# Patient Record
Sex: FEMALE | Race: WHITE | NOT HISPANIC OR LATINO | Employment: STUDENT | URBAN - NONMETROPOLITAN AREA
[De-identification: names, ages, dates, MRNs, and addresses within clinical notes are randomized per-mention and may not be internally consistent; named-entity substitution may affect disease eponyms.]

---

## 2020-03-04 NOTE — PROGRESS NOTES
YOB: 1995  Location: [facility]  Location Address: [address] [place] [address], [facility], [address] [place] [address]  Location Phone: [phone]    Chief Complaint   Patient presents with   • Ear Problem     fluid in ear LEFT ear and trouble hearing       History of Present Illness  Mi Whitley is a 24 y.o. female.  Mi Whitley is here for evaluation of ENT complaints. The patient has had problems with otalgia, ear fullness, ear pressure, popping and cracking of the ear, otorrhea, fluid on the ear, hearing loss and tinnitus  The symptoms are localized to the left> right  ear. The patient has had moderate symptoms. The symptoms have been present for the last several months There have been no identified factors that aggravate the symptoms. The symptoms are improved by steroids. Patient denies nasal congestion, nasal drainage and fever. She is on Flonase and states steroids help the ear more than any medication. She has had an audiogram today.     She does have a history of grinding her teeth at night.  Her dentist has talked to her about this before but she has not had a bite block or guard made.    Her ears feel pretty good today.      I have personally reviewed the information imported into the chart during this visit.      I have personally reviewed the review of systems.       Past Medical History:   Diagnosis Date   • Allergic rhinitis    • Chronic migraine    • GERD (gastroesophageal reflux disease)    • Headache        Past Surgical History:   Procedure Laterality Date   • WISDOM TOOTH EXTRACTION         Outpatient Medications Marked as Taking for the 3/5/20 encounter (Office Visit) with Anil Hinds MD   Medication Sig Dispense Refill   • CBD (cannabidiol) oral oil Take  by mouth.     • cetirizine (ZYRTEC ALLERGY) 10 MG tablet Zyrtec 10 mg tablet   Take 1 tablet every day by oral route.     • EPINEPHrine (EPIPEN) 0.3 MG/0.3ML solution auto-injector injection epinephrine 0.3 mg/0.3 mL  injection, auto-injector   PRN     • famotidine (PEPCID) 20 MG tablet Take 20 mg by mouth Daily.     • fluticasone (FLONASE) 50 MCG/ACT nasal spray 2 sprays into the nostril(s) as directed by provider Daily.         Nitrofurantoin; Clindamycin/lincomycin; and Tylenol [acetaminophen]    Family History   Problem Relation Age of Onset   • Sinusitis Maternal Grandfather    • Diabetes Other    • Hypertension Other        Social History     Socioeconomic History   • Marital status: Single     Spouse name: Not on file   • Number of children: Not on file   • Years of education: Not on file   • Highest education level: Not on file   Tobacco Use   • Smoking status: Never Smoker   • Smokeless tobacco: Never Used   Substance and Sexual Activity   • Alcohol use: Yes     Comment: occasionally   • Drug use: Never       Review of Systems   HENT: Positive for ear discharge, ear pain, hearing loss and tinnitus.    Eyes: Negative.    Respiratory: Negative.    Cardiovascular: Negative.    Gastrointestinal: Negative.    Endocrine: Negative.    Genitourinary: Negative.    Musculoskeletal: Negative.    Skin: Negative.    Allergic/Immunologic: Negative.    Neurological: Negative.    Hematological: Negative.    Psychiatric/Behavioral: Negative.        Vitals:    03/05/20 1401   BP: 114/56   Pulse: 71   Temp: 97.4 °F (36.3 °C)       Body mass index is 23.91 kg/m².    Objective     Physical Exam  CONSTITUTIONAL: well nourished, well-developed, alert, oriented, in no acute distress     COMMUNICATION AND VOICE: able to communicate normally, normal voice quality    HEAD: normocephalic, no lesions, atraumatic, no tenderness, no masses     FACE: appearance normal, no lesions, moderate tenderness on mandibular depression in the left preauricular area-mild to moderate ankylosis is noted no deformities, facial motion symmetric    EYES: ocular motility normal, eyelids normal, orbits normal, no proptosis, conjunctiva normal , pupils equal, round      EARS:  Hearing: hearing to conversational voice intact bilaterally   External Ears: normal bilaterally, no lesions  TM  AS-clear and intact tympanic membrane with well ventilated middle ear space.  External auditory canals normal in appearance  AD-clear and intact tympanic membrane with well ventilated middle ear space.  External auditory canals normal in appearance    NOSE:  External Nose: external nasal structure normal, no tenderness on palpation, no nasal discharge, no lesions, no evidence of trauma, nostrils patent     ORAL:  Lips: upper and lower lips without lesion,     NECK:  Inspection and Palpation: neck appearance normal, no masses or tenderness    CHEST/RESPIRATORY: normal respiratory effort     CARDIOVASCULAR: no cyanosis or edema     NEUROLOGICAL/PSYCHIATRIC: oriented to time, place and person, mood normal, affect appropriate, CN II-XII intact grossly      Assessment/Plan   Mi was seen today for ear problem.    Diagnoses and all orders for this visit:    Arthralgia of left temporomandibular joint    Dysfunction of both eustachian tubes      * Surgery not found *  No orders of the defined types were placed in this encounter.    Return in about 6 months (around 9/5/2020).       Patient Instructions   Call or return for problems particularly any recurrent left ear pain associated with decreased hearing.    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  I recommended a soft diet, prn NSAID's, an OTC bite block which can be obtained from a local pharmacy and Dental consult if this is not successful. I explained the nature of TMJ syndrome, treatment modalities and recommended a possible oral surgery evaluation for persistent problems or difficulties.

## 2020-03-05 ENCOUNTER — OFFICE VISIT (OUTPATIENT)
Dept: OTOLARYNGOLOGY | Facility: CLINIC | Age: 25
End: 2020-03-05

## 2020-03-05 ENCOUNTER — PROCEDURE VISIT (OUTPATIENT)
Dept: OTOLARYNGOLOGY | Facility: CLINIC | Age: 25
End: 2020-03-05

## 2020-03-05 VITALS
WEIGHT: 135 LBS | DIASTOLIC BLOOD PRESSURE: 56 MMHG | SYSTOLIC BLOOD PRESSURE: 114 MMHG | HEART RATE: 71 BPM | TEMPERATURE: 97.4 F | BODY MASS INDEX: 23.92 KG/M2 | HEIGHT: 63 IN

## 2020-03-05 DIAGNOSIS — M26.622 ARTHRALGIA OF LEFT TEMPOROMANDIBULAR JOINT: Primary | ICD-10-CM

## 2020-03-05 DIAGNOSIS — H69.82 DYSFUNCTION OF LEFT EUSTACHIAN TUBE: Primary | ICD-10-CM

## 2020-03-05 DIAGNOSIS — H69.83 DYSFUNCTION OF BOTH EUSTACHIAN TUBES: ICD-10-CM

## 2020-03-05 PROBLEM — H69.93 DYSFUNCTION OF BOTH EUSTACHIAN TUBES: Status: ACTIVE | Noted: 2020-03-05

## 2020-03-05 PROCEDURE — 99202 OFFICE O/P NEW SF 15 MIN: CPT | Performed by: OTOLARYNGOLOGY

## 2020-03-05 RX ORDER — EPINEPHRINE 0.3 MG/.3ML
INJECTION SUBCUTANEOUS
COMMUNITY
End: 2022-03-15 | Stop reason: SDUPTHER

## 2020-03-05 RX ORDER — FAMOTIDINE 20 MG/1
20 TABLET, FILM COATED ORAL DAILY
COMMUNITY
Start: 2019-12-24 | End: 2020-09-28 | Stop reason: SDUPTHER

## 2020-03-05 RX ORDER — CETIRIZINE HYDROCHLORIDE 10 MG/1
TABLET ORAL
COMMUNITY

## 2020-03-05 RX ORDER — FLUTICASONE PROPIONATE 50 MCG
2 SPRAY, SUSPENSION (ML) NASAL DAILY
COMMUNITY
End: 2021-08-06

## 2020-03-05 NOTE — PATIENT INSTRUCTIONS
Call or return for problems particularly any recurrent left ear pain associated with decreased hearing.    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  I recommended a soft diet, prn NSAID's, an OTC bite block which can be obtained from a local pharmacy and Dental consult if this is not successful. I explained the nature of TMJ syndrome, treatment modalities and recommended a possible oral surgery evaluation for persistent problems or difficulties.

## 2020-09-28 ENCOUNTER — OFFICE VISIT (OUTPATIENT)
Dept: OTOLARYNGOLOGY | Facility: CLINIC | Age: 25
End: 2020-09-28

## 2020-09-28 VITALS
DIASTOLIC BLOOD PRESSURE: 76 MMHG | TEMPERATURE: 98.6 F | HEART RATE: 74 BPM | SYSTOLIC BLOOD PRESSURE: 117 MMHG | BODY MASS INDEX: 26.22 KG/M2 | WEIGHT: 148 LBS | HEIGHT: 63 IN

## 2020-09-28 DIAGNOSIS — K21.9 LARYNGOPHARYNGEAL REFLUX (LPR): ICD-10-CM

## 2020-09-28 DIAGNOSIS — M26.622 ARTHRALGIA OF LEFT TEMPOROMANDIBULAR JOINT: ICD-10-CM

## 2020-09-28 DIAGNOSIS — J30.9 ALLERGIC RHINITIS, UNSPECIFIED SEASONALITY, UNSPECIFIED TRIGGER: Primary | ICD-10-CM

## 2020-09-28 DIAGNOSIS — J35.01 CHRONIC TONSILLITIS: ICD-10-CM

## 2020-09-28 DIAGNOSIS — J35.8 TONSILLITH: ICD-10-CM

## 2020-09-28 DIAGNOSIS — J03.01 RECURRENT STREPTOCOCCAL TONSILLITIS: ICD-10-CM

## 2020-09-28 PROCEDURE — 99214 OFFICE O/P EST MOD 30 MIN: CPT | Performed by: PHYSICIAN ASSISTANT

## 2020-09-28 RX ORDER — ACETAMINOPHEN AND CODEINE PHOSPHATE 120; 12 MG/5ML; MG/5ML
1 SOLUTION ORAL DAILY
COMMUNITY
Start: 2020-09-16 | End: 2023-03-14

## 2020-09-28 RX ORDER — FAMOTIDINE 20 MG/1
20 TABLET, FILM COATED ORAL 2 TIMES DAILY
Qty: 60 TABLET | Refills: 11 | Status: SHIPPED | OUTPATIENT
Start: 2020-09-28 | End: 2020-10-28

## 2020-09-28 RX ORDER — METHYLPREDNISOLONE 4 MG/1
TABLET ORAL
Qty: 1 EACH | Refills: 0 | Status: SHIPPED | OUTPATIENT
Start: 2020-09-28 | End: 2020-10-26

## 2020-09-28 RX ORDER — FLUCONAZOLE 150 MG/1
TABLET ORAL
COMMUNITY
Start: 2020-09-26 | End: 2020-10-26

## 2020-09-28 NOTE — PATIENT INSTRUCTIONS
Advised to take Flonase daily, increase Pepcid to twice a day, follow reflux precautions and dairy elimination. Start salt water gargles and Listerine gargles. Start a probiotic. Continue TMJ precautions.     Recheck in 4 weeks. If tonsil problems have not improved will consider getting a tonsillectomy.

## 2020-09-28 NOTE — PROGRESS NOTES
HAILEE Crow     Chief Complaint   Patient presents with   • Follow-up     better when sleeping with mouth guard   • Sore Throat     started in Feb, with reoccuring strept throat, having tonsils stones last couple of months        HISTORY OF PRESENT ILLNESS:     Mi Whitley is a  25 y.o.  female who is here for follow up. She has had complaints of sore throats, frequent tonsillitis, tonsilliths and tonsillar globus. The symptoms are localized to the bilateral tonsil. The symptoms severity was described as: moderate The symptoms have been: chronically occuring with acute flair ups occurring 3-4 times a year for the last several years The symptoms are aggravated by  no identifiable factors. The chronic symptoms are improved by no identifiable factors and the acute flair-ups are improved with antibiotics.    The patient was last seen about six months ago with left ear pain that is better with TMJ precautions.    Review of Systems   Constitutional: Negative for activity change, appetite change, chills, diaphoresis, fatigue, fever and unexpected weight change.   HENT: Positive for sore throat. Negative for congestion, dental problem, drooling, ear discharge, ear pain, facial swelling, hearing loss, mouth sores, nosebleeds, postnasal drip, rhinorrhea, sinus pressure, sneezing, tinnitus, trouble swallowing and voice change.    Eyes: Negative.    Respiratory: Negative.    Cardiovascular: Negative.    Gastrointestinal:        GERD   Endocrine: Negative.    Skin: Negative.    Allergic/Immunologic: Positive for environmental allergies. Negative for food allergies and immunocompromised state.   Neurological: Negative.    Hematological: Negative.    Psychiatric/Behavioral: Negative.    :    Past History:  Past Medical History:   Diagnosis Date   • Allergic rhinitis    • Chronic migraine    • GERD (gastroesophageal reflux disease)    • Headache      Past Surgical History:   Procedure Laterality Date   • WISDOM  TOOTH EXTRACTION       Family History   Problem Relation Age of Onset   • Sinusitis Maternal Grandfather    • Diabetes Other    • Hypertension Other      Social History     Tobacco Use   • Smoking status: Never Smoker   • Smokeless tobacco: Never Used   Substance Use Topics   • Alcohol use: Yes     Comment: occasionally   • Drug use: Never     Outpatient Medications Marked as Taking for the 9/28/20 encounter (Office Visit) with Aamir Dill PA   Medication Sig Dispense Refill   • cetirizine (ZYRTEC ALLERGY) 10 MG tablet Zyrtec 10 mg tablet   Take 1 tablet every day by oral route.     • EPINEPHrine (EPIPEN) 0.3 MG/0.3ML solution auto-injector injection epinephrine 0.3 mg/0.3 mL injection, auto-injector   PRN     • famotidine (PEPCID) 20 MG tablet Take 1 tablet by mouth 2 (Two) Times a Day for 30 days. 60 tablet 11   • fluconazole (DIFLUCAN) 150 MG tablet TAKE 1 TABLET BY MOUTH FOR ONE TIME DOSE. REPEAT IN 48 HOURS     • norethindrone (MICRONOR) 0.35 MG tablet Take 1 tablet by mouth Daily.     • [DISCONTINUED] famotidine (PEPCID) 20 MG tablet Take 20 mg by mouth Daily.       Allergies:  Clindamycin/lincomycin, Nitrofurantoin, and Tylenol [acetaminophen]          Vital Signs:   Vitals:    09/28/20 1042   BP: 117/76   Pulse: 74   Temp: 98.6 °F (37 °C)         EXAMINATION:   CONSTITUTIONAL: well nourished, alert, oriented, in no acute distress     COMMUNICATION AND VOICE: able to communicate normally, normal voice quality    HEAD: normocephalic, no lesions, atraumatic, no tenderness, no masses     FACE: appearance normal, no lesions, no tenderness, no deformities, facial motion symmetric    SALIVARY GLANDS: parotid glands with no tenderness, no swelling, no masses, submandibular glands with normal size, nontender    EYES: ocular motility normal, eyelids normal, orbits normal, no proptosis, conjunctiva normal , pupils equal, round     EARS:  Hearing: response to conversational voice normal bilaterally   External  Ears: auricles without lesions  Otoscopic: tympanic membrane appearance normal, no lesions, no perforation, normal mobility, no fluid    NOSE:  External Nose: structure normal, no tenderness on palpation, no nasal discharge, no lesions, no evidence of trauma, nostrils patent   Intranasal Exam: nasal mucosa normal, vestibule within normal limits, inferior turbinate normal, nasal septum midline     ORAL:  Lips: upper and lower lips without lesion   Teeth: dentition within normal limits for age   Gums: gingivae healthy   Oral Mucosa: oral mucosa normal, no mucosal lesions   Floor of Mouth: Warthin’s duct patent, mucosa normal  Tongue: lingual mucosa normal without lesions, normal tongue mobility   Palate: soft and hard palates with normal mucosa and structure  Oropharynx: oropharyngeal mucosa erythema with +2 erythematous cryptic tonsils    NECK: neck appearance normal, no mass,  noted without erythema or tenderness    THYROID: no overt thyromegaly, no tenderness, nodules or mass present on palpation, position midline     LYMPH NODES: no lymphadenopathy    CHEST/RESPIRATORY: respiratory effort normal, normal breath sounds     CARDIOVASCULAR: rate and rhythm normal, extremities without cyanosis or edema      NEUROLOGIC/PSYCHIATRIC: oriented to time, place and person, mood normal, affect appropriate, CN II-XII intact grossly    RESULTS REVIEW:    I have reviewed the patients old records in the chart.       Assessment    Diagnosis Plan   1. Allergic rhinitis, unspecified seasonality, unspecified trigger  methylPREDNISolone (Medrol) 4 MG dose pack   2. Laryngopharyngeal reflux (LPR)  famotidine (PEPCID) 20 MG tablet   3. Tonsillith     4. Chronic tonsillitis  methylPREDNISolone (Medrol) 4 MG dose pack   5. Recurrent streptococcal tonsillitis     6. Arthralgia of left temporomandibular joint         Plan    Patient Instructions   Advised to take Flonase daily, increase Pepcid to twice a day, follow reflux precautions and  dairy elimination. Start salt water gargles and Listerine gargles. Start a probiotic. Continue TMJ precautions.     Recheck in 4 weeks. If tonsil problems have not improved will consider getting a tonsillectomy.    New Medications Ordered This Visit   Medications   • famotidine (PEPCID) 20 MG tablet     Sig: Take 1 tablet by mouth 2 (Two) Times a Day for 30 days.     Dispense:  60 tablet     Refill:  11   • methylPREDNISolone (Medrol) 4 MG dose pack     Sig: Take as directed on package instructions.     Dispense:  1 each     Refill:  0             Return in about 4 weeks (around 10/26/2020) for Recheck tonsils.    HIALEE Crow  09/28/20  19:09 CDT

## 2020-09-29 NOTE — PROGRESS NOTES
Anil Hinds MD   I have seen and/or examined Mi Whitley and have reviewed the notes, assessments, and/or procedures and I concur with this documentation.    Anil Hinds MD, FACS  09/29/20  10:27 AM CDT

## 2020-10-26 ENCOUNTER — OFFICE VISIT (OUTPATIENT)
Dept: OTOLARYNGOLOGY | Facility: CLINIC | Age: 25
End: 2020-10-26

## 2020-10-26 VITALS
WEIGHT: 150 LBS | HEART RATE: 88 BPM | HEIGHT: 63 IN | TEMPERATURE: 98 F | DIASTOLIC BLOOD PRESSURE: 83 MMHG | SYSTOLIC BLOOD PRESSURE: 120 MMHG | BODY MASS INDEX: 26.58 KG/M2

## 2020-10-26 DIAGNOSIS — J30.9 ALLERGIC RHINITIS, UNSPECIFIED SEASONALITY, UNSPECIFIED TRIGGER: Primary | ICD-10-CM

## 2020-10-26 DIAGNOSIS — M26.622 ARTHRALGIA OF LEFT TEMPOROMANDIBULAR JOINT: ICD-10-CM

## 2020-10-26 DIAGNOSIS — J03.01 RECURRENT STREPTOCOCCAL TONSILLITIS: ICD-10-CM

## 2020-10-26 DIAGNOSIS — H69.83 DYSFUNCTION OF BOTH EUSTACHIAN TUBES: ICD-10-CM

## 2020-10-26 DIAGNOSIS — K21.9 LARYNGOPHARYNGEAL REFLUX (LPR): ICD-10-CM

## 2020-10-26 DIAGNOSIS — J35.8 TONSILLITH: ICD-10-CM

## 2020-10-26 PROCEDURE — 99214 OFFICE O/P EST MOD 30 MIN: CPT | Performed by: PHYSICIAN ASSISTANT

## 2020-10-26 RX ORDER — AZELASTINE 1 MG/ML
2 SPRAY, METERED NASAL 2 TIMES DAILY
Qty: 30 ML | Refills: 11 | Status: SHIPPED | OUTPATIENT
Start: 2020-10-26 | End: 2022-11-30

## 2020-10-26 RX ORDER — METRONIDAZOLE 7.5 MG/G
GEL VAGINAL
COMMUNITY
Start: 2020-10-23 | End: 2020-12-07

## 2020-10-26 NOTE — PROGRESS NOTES
HAILEE Crow     Chief Complaint   Patient presents with   • Follow-up     tonsils        HISTORY OF PRESENT ILLNESS:     Mi Whitley is a  25 y.o.  female who is here for follow up. She has had complaints of sore throats, frequent tonsillitis, tonsilliths and tonsillar globus. The symptoms are localized to the bilateral tonsil. The symptoms severity was described as: improving. The symptoms have been: decreasing in amount for the last several weeks. The symptoms are aggravated by  allergy and weather change. The chronic symptoms are improved with current treatment plan with Pepcid, Flonase, zyrtec, salt water gargles, and Listerine gargles.      Review of Systems   Constitutional: Negative for activity change, appetite change, chills, diaphoresis, fatigue, fever and unexpected weight change.   HENT: Positive for sore throat. Negative for congestion, dental problem, drooling, ear discharge, ear pain, facial swelling, hearing loss, mouth sores, nosebleeds, postnasal drip, rhinorrhea, sinus pressure, sneezing, tinnitus, trouble swallowing and voice change.    Eyes: Negative.    Respiratory: Negative.    Cardiovascular: Negative.    Gastrointestinal:        GERD   Endocrine: Negative.    Skin: Negative.    Allergic/Immunologic: Positive for environmental allergies. Negative for food allergies and immunocompromised state.   Neurological: Negative.    Hematological: Negative.    Psychiatric/Behavioral: Negative.    :    Past History:  Past Medical History:   Diagnosis Date   • Allergic rhinitis    • Chronic migraine    • GERD (gastroesophageal reflux disease)    • Headache      Past Surgical History:   Procedure Laterality Date   • WISDOM TOOTH EXTRACTION       Family History   Problem Relation Age of Onset   • Sinusitis Maternal Grandfather    • Diabetes Other    • Hypertension Other      Social History     Tobacco Use   • Smoking status: Never Smoker   • Smokeless tobacco: Never Used   Substance Use  Topics   • Alcohol use: Yes     Comment: occasionally   • Drug use: Never     Outpatient Medications Marked as Taking for the 10/26/20 encounter (Office Visit) with Aamir Dill PA   Medication Sig Dispense Refill   • cetirizine (ZYRTEC ALLERGY) 10 MG tablet Zyrtec 10 mg tablet   Take 1 tablet every day by oral route.     • EPINEPHrine (EPIPEN) 0.3 MG/0.3ML solution auto-injector injection epinephrine 0.3 mg/0.3 mL injection, auto-injector   PRN     • famotidine (PEPCID) 20 MG tablet Take 1 tablet by mouth 2 (Two) Times a Day for 30 days. 60 tablet 11   • metroNIDAZOLE (METROGEL) 0.75 % vaginal gel INSERT 1 APPLICATORFUL VAGINALLY ONCE DAILY FOR 5 DAYS     • norethindrone (MICRONOR) 0.35 MG tablet Take 1 tablet by mouth Daily.       Allergies:  Clindamycin/lincomycin, Nitrofurantoin, and Tylenol [acetaminophen]          Vital Signs:   Vitals:    10/26/20 0955   BP: 120/83   Pulse: 88   Temp: 98 °F (36.7 °C)         EXAMINATION:   CONSTITUTIONAL: well nourished, alert, oriented, in no acute distress     COMMUNICATION AND VOICE: able to communicate normally, normal voice quality    HEAD: normocephalic, no lesions, atraumatic, no tenderness, no masses     FACE: appearance normal, no lesions, no tenderness, no deformities, facial motion symmetric    SALIVARY GLANDS: parotid glands with no tenderness, no swelling, no masses, submandibular glands with normal size, nontender    EYES: ocular motility normal, eyelids normal, orbits normal, no proptosis, conjunctiva normal , pupils equal, round     EARS:  Hearing: response to conversational voice normal bilaterally   External Ears: auricles without lesions    NOSE:  External Nose: structure normal, no tenderness on palpation, no nasal discharge, no lesions, no evidence of trauma, nostrils patent     ORAL:  Lips: upper and lower lips without lesion   Teeth: dentition within normal limits for age   Gums: gingivae healthy   Oral Mucosa: oral mucosa normal, no mucosal  lesions   Floor of Mouth: Warthin’s duct patent, mucosa normal  Tongue: lingual mucosa normal without lesions, normal tongue mobility   Palate: soft and hard palates with normal mucosa and structure  Oropharynx: oropharyngeal mucosa normal with +1 cryptic tonsils    NECK: neck appearance normal, no mass,  noted without erythema or tenderness    THYROID: no overt thyromegaly, no tenderness, nodules or mass present on palpation, position midline     LYMPH NODES: no lymphadenopathy    CHEST/RESPIRATORY: respiratory effort normal, normal breath sounds     CARDIOVASCULAR: rate and rhythm normal, extremities without cyanosis or edema      NEUROLOGIC/PSYCHIATRIC: oriented to time, place and person, mood normal, affect appropriate, CN II-XII intact grossly    RESULTS REVIEW:    I have reviewed the patients old records in the chart.       Assessment    Diagnosis Plan   1. Allergic rhinitis, unspecified seasonality, unspecified trigger  azelastine (ASTELIN) 0.1 % nasal spray   2. Laryngopharyngeal reflux (LPR)     3. Recurrent streptococcal tonsillitis     4. Tonsillith     5. Dysfunction of both eustachian tubes     6. Arthralgia of left temporomandibular joint         Plan    Patient Instructions   Continue treatment as directed, will start astelin as needed for days with increased allergy symptoms. If symptoms get worse call for sooner appointment, otherwise recheck in 6 months.    New Medications Ordered This Visit   Medications   • azelastine (ASTELIN) 0.1 % nasal spray     Si sprays into the nostril(s) as directed by provider 2 (Two) Times a Day. Use in each nostril as directed     Dispense:  30 mL     Refill:  11             Return in about 6 months (around 2021) for Recheck throat.    HAILEE Crow  10/26/20  10:15 CDT

## 2020-10-26 NOTE — PATIENT INSTRUCTIONS
Continue treatment as directed, will start astelin as needed for days with increased allergy symptoms. If symptoms get worse call for sooner appointment, otherwise recheck in 6 months.

## 2020-11-30 ENCOUNTER — OFFICE VISIT (OUTPATIENT)
Dept: FAMILY MEDICINE CLINIC | Facility: CLINIC | Age: 25
End: 2020-11-30

## 2020-11-30 VITALS
HEIGHT: 63 IN | BODY MASS INDEX: 25.8 KG/M2 | WEIGHT: 145.6 LBS | RESPIRATION RATE: 16 BRPM | TEMPERATURE: 97.8 F | SYSTOLIC BLOOD PRESSURE: 116 MMHG | HEART RATE: 87 BPM | OXYGEN SATURATION: 99 % | DIASTOLIC BLOOD PRESSURE: 79 MMHG

## 2020-11-30 DIAGNOSIS — Z76.89 ENCOUNTER TO ESTABLISH CARE: ICD-10-CM

## 2020-11-30 DIAGNOSIS — Z00.00 ANNUAL PHYSICAL EXAM: ICD-10-CM

## 2020-11-30 DIAGNOSIS — R41.840 IMPAIRED ATTENTION: Primary | ICD-10-CM

## 2020-11-30 DIAGNOSIS — R41.840 DIFFICULTY CONCENTRATING: ICD-10-CM

## 2020-11-30 DIAGNOSIS — F41.9 ANXIETY: ICD-10-CM

## 2020-11-30 PROCEDURE — 99203 OFFICE O/P NEW LOW 30 MIN: CPT | Performed by: NURSE PRACTITIONER

## 2020-11-30 RX ORDER — FAMOTIDINE 20 MG/1
20 TABLET, FILM COATED ORAL 2 TIMES DAILY
COMMUNITY
End: 2021-04-30 | Stop reason: SDUPTHER

## 2020-11-30 NOTE — PROGRESS NOTES
Subjective   Mi Whitley is a 25 y.o. female presents in office to establish care and evaluation adhd.    History of Present Illness   Issues with focusing- staying focused- during tasks starts thinking about other things and stops doing tasks  Cannot remember new information  Mild fidgeting- not very much hyperactivity  Appetite good  Sleep varies. Toss and turns when more stressed.   Has anxiety related to overwhelmed with daily tasks  Has history of depression and treated for years  Has tried zoloft, buspar and wellbutrin and did not tolerate- has been about 6 years since previous medications    pmh- anxiety/depression, migraines, vasovagal syncope (about 8 yrs ago), arhythmia from stress, tonsillitis (seeing ent currently for)  psh- wisdom teeth removal    Single- has sex with same male partner- uses oc- last pap smear 2018 in [place], [place]  No children  Work and goes to college- studying business administration  No history of drug abuse, no tobacco, no excessive alcohol use  Does not typically get influenza vaccine- declines   Eats pretty healthy diet, usually tries to exercise 3-4 times weekly- has not lately    The following portions of the patient's history were reviewed and updated as appropriate: allergies, current medications, past family history, past medical history, past social history, past surgical history and problem list.    Review of Systems   Constitutional: Negative for activity change, appetite change, chills, diaphoresis, fatigue, fever and unexpected weight change.   HENT: Negative for congestion, rhinorrhea and trouble swallowing.    Respiratory: Negative for cough and shortness of breath.    Cardiovascular: Negative for chest pain, palpitations and leg swelling.   Gastrointestinal: Negative for abdominal pain, constipation, diarrhea, nausea and vomiting.   Genitourinary: Negative for difficulty urinating and dysuria.   Musculoskeletal: Negative for arthralgias, back pain and myalgias.    Skin: Negative for rash.   Neurological: Negative for headaches.   Psychiatric/Behavioral: Positive for decreased concentration. Negative for agitation, behavioral problems, confusion, dysphoric mood, sleep disturbance and suicidal ideas. The patient is nervous/anxious. The patient is not hyperactive.        Objective     Vitals:    11/30/20 0829   BP: 116/79   Pulse: 87   Resp: 16   Temp: 97.8 °F (36.6 °C)   SpO2: 99%       Physical Exam  Vitals signs and nursing note reviewed.   Constitutional:       General: She is not in acute distress.     Appearance: She is well-developed.   HENT:      Right Ear: Tympanic membrane, ear canal and external ear normal.      Left Ear: Tympanic membrane, ear canal and external ear normal.      Nose: Nose normal.      Right Sinus: No maxillary sinus tenderness or frontal sinus tenderness.      Left Sinus: No maxillary sinus tenderness or frontal sinus tenderness.      Mouth/Throat:      Pharynx: Uvula midline. No uvula swelling.   Eyes:      Conjunctiva/sclera: Conjunctivae normal.   Neck:      Musculoskeletal: Neck supple.      Thyroid: No thyromegaly.      Trachea: No tracheal deviation.   Cardiovascular:      Rate and Rhythm: Normal rate and regular rhythm.      Heart sounds: Normal heart sounds.   Pulmonary:      Effort: Pulmonary effort is normal.      Breath sounds: Normal breath sounds.   Abdominal:      General: Bowel sounds are normal. There is no abdominal bruit.      Palpations: Abdomen is soft. There is no mass.      Tenderness: There is no abdominal tenderness.   Lymphadenopathy:      Cervical: No cervical adenopathy.   Skin:     General: Skin is warm and dry.   Neurological:      Mental Status: She is alert.   Psychiatric:         Behavior: Behavior normal.            Patient's Body mass index is 25.79 kg/m². BMI is above normal parameters. Recommendations include: exercise counseling       Assessment/Plan   Diagnoses and all orders for this visit:    1. Impaired  attention (Primary)  -     TSH  -     T4, Free  -     CBC & Differential  -     Comprehensive metabolic panel  -     Vitamin B12  -     Vitamin D 25 hydroxy    2. Encounter to establish care    3. Difficulty concentrating  -     TSH  -     T4, Free  -     CBC & Differential  -     Comprehensive metabolic panel  -     Vitamin B12  -     Vitamin D 25 hydroxy    4. Anxiety    5. Annual physical exam      Counseling/Anticipatory Guidance: discussed health nutrition, family planning/contraception, physical activity, healthy weight. Advised on injury prevention, misuse of tobacco, alcohol and drugs, sexual behavior and STDs. Encouraged regular dental health, mental health.   Discussed recommended immunizations, screenings for her age via - declines all vaccinations today- gets std screening with gynecology regularly  Encouraged sbe    Packet given for ADHD interview- f/u with Dr. Belle for eval of this- discussed with patient that Dr. Belle will probably want to try other medications prior to narcotics, leaving these as last resort or possibly even wanting to refer pt to psych for further eval before using these for adhd treatment.    Discussed a variety of possible medication options for her to research        Return in about 1 week (around 12/7/2020) for Recheck with Dr. Belle for ADHD evaluation- follow up in 6 months with me.             Electronically signed by JEAN Stephenson, 11/30/20, 9:19 AM CST.

## 2020-12-01 DIAGNOSIS — E55.9 VITAMIN D DEFICIENCY: Primary | ICD-10-CM

## 2020-12-01 LAB
25(OH)D3+25(OH)D2 SERPL-MCNC: 18.3 NG/ML (ref 30–100)
ALBUMIN SERPL-MCNC: 4.6 G/DL (ref 3.5–5.2)
ALBUMIN/GLOB SERPL: 1.8 G/DL
ALP SERPL-CCNC: 75 U/L (ref 39–117)
ALT SERPL-CCNC: 13 U/L (ref 1–33)
AST SERPL-CCNC: 19 U/L (ref 1–32)
BASOPHILS # BLD AUTO: 0.04 10*3/MM3 (ref 0–0.2)
BASOPHILS NFR BLD AUTO: 0.6 % (ref 0–1.5)
BILIRUB SERPL-MCNC: 0.4 MG/DL (ref 0–1.2)
BUN SERPL-MCNC: 8 MG/DL (ref 6–20)
BUN/CREAT SERPL: 11.1 (ref 7–25)
CALCIUM SERPL-MCNC: 9.3 MG/DL (ref 8.6–10.5)
CHLORIDE SERPL-SCNC: 100 MMOL/L (ref 98–107)
CO2 SERPL-SCNC: 26.2 MMOL/L (ref 22–29)
CREAT SERPL-MCNC: 0.72 MG/DL (ref 0.57–1)
EOSINOPHIL # BLD AUTO: 0.09 10*3/MM3 (ref 0–0.4)
EOSINOPHIL NFR BLD AUTO: 1.3 % (ref 0.3–6.2)
ERYTHROCYTE [DISTWIDTH] IN BLOOD BY AUTOMATED COUNT: 11.4 % (ref 12.3–15.4)
GLOBULIN SER CALC-MCNC: 2.6 GM/DL
GLUCOSE SERPL-MCNC: 78 MG/DL (ref 65–99)
HCT VFR BLD AUTO: 44.1 % (ref 34–46.6)
HGB BLD-MCNC: 14.5 G/DL (ref 12–15.9)
IMM GRANULOCYTES # BLD AUTO: 0.02 10*3/MM3 (ref 0–0.05)
IMM GRANULOCYTES NFR BLD AUTO: 0.3 % (ref 0–0.5)
LYMPHOCYTES # BLD AUTO: 1.74 10*3/MM3 (ref 0.7–3.1)
LYMPHOCYTES NFR BLD AUTO: 24.5 % (ref 19.6–45.3)
MCH RBC QN AUTO: 29.7 PG (ref 26.6–33)
MCHC RBC AUTO-ENTMCNC: 32.9 G/DL (ref 31.5–35.7)
MCV RBC AUTO: 90.2 FL (ref 79–97)
MONOCYTES # BLD AUTO: 0.69 10*3/MM3 (ref 0.1–0.9)
MONOCYTES NFR BLD AUTO: 9.7 % (ref 5–12)
NEUTROPHILS # BLD AUTO: 4.51 10*3/MM3 (ref 1.7–7)
NEUTROPHILS NFR BLD AUTO: 63.6 % (ref 42.7–76)
NRBC BLD AUTO-RTO: 0 /100 WBC (ref 0–0.2)
PLATELET # BLD AUTO: 253 10*3/MM3 (ref 140–450)
POTASSIUM SERPL-SCNC: 4.1 MMOL/L (ref 3.5–5.2)
PROT SERPL-MCNC: 7.2 G/DL (ref 6–8.5)
RBC # BLD AUTO: 4.89 10*6/MM3 (ref 3.77–5.28)
SODIUM SERPL-SCNC: 137 MMOL/L (ref 136–145)
T4 FREE SERPL-MCNC: 1.19 NG/DL (ref 0.93–1.7)
TSH SERPL DL<=0.005 MIU/L-ACNC: 1.95 UIU/ML (ref 0.27–4.2)
VIT B12 SERPL-MCNC: 573 PG/ML (ref 211–946)
WBC # BLD AUTO: 7.09 10*3/MM3 (ref 3.4–10.8)

## 2020-12-01 RX ORDER — ERGOCALCIFEROL 1.25 MG/1
50000 CAPSULE ORAL WEEKLY
Qty: 6 CAPSULE | Refills: 0 | Status: SHIPPED | OUTPATIENT
Start: 2020-12-01 | End: 2021-05-10 | Stop reason: SDDI

## 2020-12-07 ENCOUNTER — OFFICE VISIT (OUTPATIENT)
Dept: FAMILY MEDICINE CLINIC | Facility: CLINIC | Age: 25
End: 2020-12-07

## 2020-12-07 VITALS
WEIGHT: 146.4 LBS | RESPIRATION RATE: 16 BRPM | HEART RATE: 77 BPM | SYSTOLIC BLOOD PRESSURE: 116 MMHG | BODY MASS INDEX: 25 KG/M2 | TEMPERATURE: 96.2 F | HEIGHT: 64 IN | OXYGEN SATURATION: 98 % | DIASTOLIC BLOOD PRESSURE: 70 MMHG

## 2020-12-07 DIAGNOSIS — F90.9 ADULT ADHD: Primary | ICD-10-CM

## 2020-12-07 PROCEDURE — 93000 ELECTROCARDIOGRAM COMPLETE: CPT | Performed by: FAMILY MEDICINE

## 2020-12-07 PROCEDURE — 99213 OFFICE O/P EST LOW 20 MIN: CPT | Performed by: FAMILY MEDICINE

## 2020-12-07 RX ORDER — NYSTATIN AND TRIAMCINOLONE ACETONIDE 100000; 1 [USP'U]/G; MG/G
OINTMENT TOPICAL 2 TIMES DAILY PRN
COMMUNITY

## 2020-12-07 NOTE — PROGRESS NOTES
"Subjective cc: ADHD luke Whitley is a 25 y.o. female who presents with complaint of ADHD.  She brings her packet for review today.    She is concerned about ADHD - thought it was an issue when she was younger but her parents didn't want ot start her on medication. She has difficulty retaining information. She feels like it is affecting her daily life. She cannot get anything done. hse is currently working nad in school. Her grades are not the best. She usually has a C average but the last 1-2 years she has been struggling more. Her job is okay - not at risk of getting fired but it is difficult - cant retain the information.     No known cardiac issues - has seen a cardio because of palpitations - advised it was due to stress. She is no longer having palpitations.        History of Present Illness     The following portions of the patient's history were reviewed and updated as appropriate: allergies, current medications, past family history, past medical history, past social history, past surgical history and problem list.        Review of Systems   Cardiovascular: Negative for chest pain and palpitations.   Psychiatric/Behavioral: Positive for decreased concentration. The patient is nervous/anxious.    All other systems reviewed and are negative.      Objective   Blood pressure 116/70, pulse 77, temperature 96.2 °F (35.7 °C), temperature source Infrared, resp. rate 16, height 161.3 cm (63.5\"), weight 66.4 kg (146 lb 6.4 oz), SpO2 98 %, not currently breastfeeding.    Physical Exam  Vitals signs and nursing note reviewed.   Constitutional:       General: She is not in acute distress.     Appearance: She is well-developed. She is not diaphoretic.   HENT:      Head: Normocephalic and atraumatic.      Right Ear: External ear normal.      Left Ear: External ear normal.      Nose: Nose normal.   Eyes:      General:         Right eye: No discharge.         Left eye: No discharge.      Conjunctiva/sclera: " Conjunctivae normal.   Neck:      Musculoskeletal: Normal range of motion.      Thyroid: No thyromegaly.      Trachea: No tracheal deviation.   Cardiovascular:      Rate and Rhythm: Normal rate and regular rhythm.      Heart sounds: Normal heart sounds.   Pulmonary:      Effort: Pulmonary effort is normal. No respiratory distress.      Breath sounds: Normal breath sounds. No stridor. No wheezing.   Chest:      Chest wall: No tenderness.   Abdominal:      General: There is no distension.      Palpations: Abdomen is soft.      Tenderness: There is no abdominal tenderness.   Musculoskeletal: Normal range of motion.   Lymphadenopathy:      Cervical: No cervical adenopathy.   Skin:     General: Skin is warm and dry.   Neurological:      Mental Status: She is alert and oriented to person, place, and time.      Motor: No abnormal muscle tone.      Coordination: Coordination normal.   Psychiatric:         Mood and Affect: Mood normal.         Behavior: Behavior normal.         Thought Content: Thought content normal.         Judgment: Judgment normal.         ECG 12 Lead    Date/Time: 12/7/2020 11:05 AM  Performed by: Mi Belle MD  Authorized by: Mi Belle MD   Previous ECG: no previous ECG available  Rhythm: sinus rhythm  Rate: normal  QRS axis: normal    Clinical impression: normal ECG            Assessment/Plan   Problems Addressed this Visit     None      Visit Diagnoses     Adult ADHD    -  Primary    Relevant Medications    lisdexamfetamine (Vyvanse) 30 MG capsule    Other Relevant Orders    ECG 12 Lead    ToxASSURE Select 13 (MW) - Urine, Clean Catch      Diagnoses       Codes Comments    Adult ADHD    -  Primary ICD-10-CM: F90.9  ICD-9-CM: 314.01           Advised on risks and beenfits of medication   Controlled contract   UDS  EKG normal   Trial of vyvanse - recheck in 1 month           This document has been electronically signed by Mi Belle MD on December 7, 2020 11:06 CST

## 2020-12-10 ENCOUNTER — TELEPHONE (OUTPATIENT)
Dept: FAMILY MEDICINE CLINIC | Facility: CLINIC | Age: 25
End: 2020-12-10

## 2020-12-10 LAB — DRUGS UR: NORMAL

## 2020-12-10 NOTE — TELEPHONE ENCOUNTER
PATIENT CALLED TODAY STATING SHE IS HAVING SOME PROBLEMS WITH THE MEDICATION THAT WAS PRESCRIBED TO HER. THE LEFT SIDE OF HER HEAD AND NECK IS HURTING, AND SHE IS  CONFUSED AT TIMES AND HAS A  WEIRD FEELING ALL OVER BODY AND VISION IS BLURRED. MEDICATION IS  lisdexamfetamine (Vyvanse) 30 MG capsule.     PLEASE CALL PATIENT BACK  [phone]

## 2021-01-07 ENCOUNTER — OFFICE VISIT (OUTPATIENT)
Dept: FAMILY MEDICINE CLINIC | Facility: CLINIC | Age: 26
End: 2021-01-07

## 2021-01-07 VITALS
HEIGHT: 64 IN | DIASTOLIC BLOOD PRESSURE: 72 MMHG | HEART RATE: 81 BPM | TEMPERATURE: 96 F | OXYGEN SATURATION: 100 % | RESPIRATION RATE: 14 BRPM | WEIGHT: 145.2 LBS | SYSTOLIC BLOOD PRESSURE: 116 MMHG | BODY MASS INDEX: 24.79 KG/M2

## 2021-01-07 DIAGNOSIS — G43.809 OTHER MIGRAINE WITHOUT STATUS MIGRAINOSUS, NOT INTRACTABLE: ICD-10-CM

## 2021-01-07 DIAGNOSIS — F90.9 ADULT ADHD: Primary | ICD-10-CM

## 2021-01-07 PROCEDURE — 99214 OFFICE O/P EST MOD 30 MIN: CPT | Performed by: FAMILY MEDICINE

## 2021-01-07 RX ORDER — DEXTROAMPHETAMINE SACCHARATE, AMPHETAMINE ASPARTATE MONOHYDRATE, DEXTROAMPHETAMINE SULFATE AND AMPHETAMINE SULFATE 2.5; 2.5; 2.5; 2.5 MG/1; MG/1; MG/1; MG/1
10 CAPSULE, EXTENDED RELEASE ORAL EVERY MORNING
Qty: 30 CAPSULE | Refills: 0 | Status: SHIPPED | OUTPATIENT
Start: 2021-01-07 | End: 2021-02-09 | Stop reason: SDUPTHER

## 2021-01-07 NOTE — PROGRESS NOTES
"Subjective cc: ADHD luke Whitley is a 25 y.o. female who presents with complaint of ADHD.    She reports she had a migraine with vyvanse and stopped it. Was seen in ED because of numbness and need to r/o CVA.   She is interested in trying something else for ADHD.     No known cardiac issues - has seen a cardio because of palpitations - advised it was due to stress. She is no longer having palpitations.        History of Present Illness     The following portions of the patient's history were reviewed and updated as appropriate: allergies, current medications, past family history, past medical history, past social history, past surgical history and problem list.        Review of Systems   Cardiovascular: Negative for chest pain and palpitations.   Skin: Positive for rash.   Neurological: Positive for headaches.        Now resolved   Psychiatric/Behavioral: Positive for decreased concentration. The patient is nervous/anxious.    All other systems reviewed and are negative.      Objective   Blood pressure 116/72, pulse 81, temperature 96 °F (35.6 °C), temperature source Infrared, resp. rate 14, height 161.3 cm (63.5\"), weight 65.9 kg (145 lb 3.2 oz), SpO2 100 %, not currently breastfeeding.    Physical Exam  Vitals signs and nursing note reviewed.   Constitutional:       General: She is not in acute distress.     Appearance: She is well-developed. She is not diaphoretic.   HENT:      Head: Normocephalic and atraumatic.      Right Ear: External ear normal.      Left Ear: External ear normal.      Nose: Nose normal.   Eyes:      General:         Right eye: No discharge.         Left eye: No discharge.      Conjunctiva/sclera: Conjunctivae normal.   Neck:      Musculoskeletal: Normal range of motion.      Thyroid: No thyromegaly.      Trachea: No tracheal deviation.   Cardiovascular:      Rate and Rhythm: Normal rate and regular rhythm.      Heart sounds: Normal heart sounds.   Pulmonary:      Effort: Pulmonary " effort is normal. No respiratory distress.      Breath sounds: Normal breath sounds. No stridor. No wheezing.   Chest:      Chest wall: No tenderness.   Musculoskeletal: Normal range of motion.   Lymphadenopathy:      Cervical: No cervical adenopathy.   Skin:     General: Skin is warm and dry.      Findings: Rash (bilateral forearms) present.   Neurological:      Mental Status: She is alert and oriented to person, place, and time.      Motor: No abnormal muscle tone.      Coordination: Coordination normal.   Psychiatric:         Mood and Affect: Mood normal.         Behavior: Behavior normal.         Thought Content: Thought content normal.         Judgment: Judgment normal.       Procedures    Assessment/Plan   Problems Addressed this Visit     None      Visit Diagnoses     Adult ADHD    -  Primary    Relevant Medications    amphetamine-dextroamphetamine XR (Adderall XR) 10 MG 24 hr capsule    Other migraine without status migrainosus, not intractable          Diagnoses       Codes Comments    Adult ADHD    -  Primary ICD-10-CM: F90.9  ICD-9-CM: 314.01     Other migraine without status migrainosus, not intractable     ICD-10-CM: G43.809  ICD-9-CM: 346.80           Advised on risks and benefits of medication   Controlled contract in chart  UDS UTD  EKG normal   Trial of adderall XR - recheck in 1 month   Can use current steroid cream on rash           This document has been electronically signed by Mi Belle MD on January 7, 2021 09:48 CST

## 2021-02-09 ENCOUNTER — TELEMEDICINE (OUTPATIENT)
Dept: FAMILY MEDICINE CLINIC | Facility: CLINIC | Age: 26
End: 2021-02-09

## 2021-02-09 DIAGNOSIS — F90.9 ADULT ADHD: ICD-10-CM

## 2021-02-09 PROCEDURE — 99212 OFFICE O/P EST SF 10 MIN: CPT | Performed by: FAMILY MEDICINE

## 2021-02-09 RX ORDER — DEXTROAMPHETAMINE SACCHARATE, AMPHETAMINE ASPARTATE MONOHYDRATE, DEXTROAMPHETAMINE SULFATE AND AMPHETAMINE SULFATE 3.75; 3.75; 3.75; 3.75 MG/1; MG/1; MG/1; MG/1
15 CAPSULE, EXTENDED RELEASE ORAL EVERY MORNING
Qty: 30 CAPSULE | Refills: 0 | Status: SHIPPED | OUTPATIENT
Start: 2021-02-09 | End: 2021-03-11 | Stop reason: SDUPTHER

## 2021-02-09 NOTE — PROGRESS NOTES
Subjective cc: ADHD luke Whitley is a 25 y.o. female who presents via video visit with complaint of ADHD.  She consents to video visit.     She feels like the adderall XR 10 mg is working okay, it wears off quickly however and when it wears off she gets a headache and occasionally a little dizzy.       No known cardiac issues - has seen a cardio because of palpitations - advised it was due to stress. She is no longer having palpitations.        History of Present Illness     The following portions of the patient's history were reviewed and updated as appropriate: allergies, current medications, past family history, past medical history, past social history, past surgical history and problem list.        Review of Systems   Cardiovascular: Negative for chest pain and palpitations.   Neurological: Positive for headaches.   Psychiatric/Behavioral: Positive for decreased concentration. The patient is nervous/anxious.    All other systems reviewed and are negative.      Objective   not currently breastfeeding.    Physical Exam  Procedures    Assessment/Plan   Problems Addressed this Visit     None      Visit Diagnoses     Adult ADHD        Relevant Medications    amphetamine-dextroamphetamine XR (ADDERALL XR) 15 MG 24 hr capsule      Diagnoses       Codes Comments    Adult ADHD     ICD-10-CM: F90.9  ICD-9-CM: 314.01           Advised on risks and benefits of medication   Controlled contract in chart  UDS UTD  EKG normal   Increase dose of adderall XR - recheck in 3 months     10 minutes          This document has been electronically signed by Mi Belle MD on February 9, 2021 11:55 CST

## 2021-03-11 DIAGNOSIS — F90.9 ADULT ADHD: ICD-10-CM

## 2021-03-11 RX ORDER — DEXTROAMPHETAMINE SACCHARATE, AMPHETAMINE ASPARTATE MONOHYDRATE, DEXTROAMPHETAMINE SULFATE AND AMPHETAMINE SULFATE 3.75; 3.75; 3.75; 3.75 MG/1; MG/1; MG/1; MG/1
15 CAPSULE, EXTENDED RELEASE ORAL EVERY MORNING
Qty: 30 CAPSULE | Refills: 0 | Status: SHIPPED | OUTPATIENT
Start: 2021-03-11 | End: 2021-04-13 | Stop reason: SDUPTHER

## 2021-03-11 NOTE — TELEPHONE ENCOUNTER
Caller: Mi Whitley    Relationship: Self    Best call back number: [phone]    Medication needed:   Requested Prescriptions     Pending Prescriptions Disp Refills   • amphetamine-dextroamphetamine XR (ADDERALL XR) 15 MG 24 hr capsule 30 capsule 0     Sig: Take 1 capsule by mouth Every Morning     Does the patient have less than a 3 day supply:  [x] Yes  [] No    What is the patient's preferred pharmacy: [address] [phone] [facility] [phone]

## 2021-04-13 DIAGNOSIS — F90.9 ADULT ADHD: ICD-10-CM

## 2021-04-13 NOTE — TELEPHONE ENCOUNTER
Caller: Mi Whitley    Relationship: Self    Best call back number: [phone]    Medication needed:   Requested Prescriptions     Pending Prescriptions Disp Refills   • amphetamine-dextroamphetamine XR (ADDERALL XR) 15 MG 24 hr capsule 30 capsule 0     Sig: Take 1 capsule by mouth Every Morning       When do you need the refill by: 4/13/21        Does the patient have less than a 3 day supply:  [x] Yes  [] No    What is the patient's preferred pharmacy: [address] [phone] [facility] [phone]

## 2021-04-15 RX ORDER — DEXTROAMPHETAMINE SACCHARATE, AMPHETAMINE ASPARTATE MONOHYDRATE, DEXTROAMPHETAMINE SULFATE AND AMPHETAMINE SULFATE 3.75; 3.75; 3.75; 3.75 MG/1; MG/1; MG/1; MG/1
15 CAPSULE, EXTENDED RELEASE ORAL EVERY MORNING
Qty: 30 CAPSULE | Refills: 0 | Status: SHIPPED | OUTPATIENT
Start: 2021-04-15 | End: 2021-05-10 | Stop reason: SDUPTHER

## 2021-04-30 ENCOUNTER — OFFICE VISIT (OUTPATIENT)
Dept: OTOLARYNGOLOGY | Facility: CLINIC | Age: 26
End: 2021-04-30

## 2021-04-30 VITALS
BODY MASS INDEX: 23.83 KG/M2 | SYSTOLIC BLOOD PRESSURE: 129 MMHG | HEART RATE: 91 BPM | DIASTOLIC BLOOD PRESSURE: 79 MMHG | HEIGHT: 64 IN | WEIGHT: 139.6 LBS | TEMPERATURE: 97.1 F

## 2021-04-30 DIAGNOSIS — K21.9 LARYNGOPHARYNGEAL REFLUX (LPR): ICD-10-CM

## 2021-04-30 DIAGNOSIS — J31.0 CHRONIC RHINITIS: Primary | ICD-10-CM

## 2021-04-30 DIAGNOSIS — J35.8 TONSILLITH: ICD-10-CM

## 2021-04-30 DIAGNOSIS — J03.01 RECURRENT STREPTOCOCCAL TONSILLITIS: ICD-10-CM

## 2021-04-30 PROCEDURE — 99214 OFFICE O/P EST MOD 30 MIN: CPT | Performed by: NURSE PRACTITIONER

## 2021-04-30 RX ORDER — FAMOTIDINE 20 MG/1
20 TABLET, FILM COATED ORAL 2 TIMES DAILY
Qty: 60 TABLET | Refills: 6 | Status: SHIPPED | OUTPATIENT
Start: 2021-04-30 | End: 2021-05-30

## 2021-05-10 ENCOUNTER — OFFICE VISIT (OUTPATIENT)
Dept: FAMILY MEDICINE CLINIC | Facility: CLINIC | Age: 26
End: 2021-05-10

## 2021-05-10 VITALS
SYSTOLIC BLOOD PRESSURE: 116 MMHG | HEIGHT: 64 IN | BODY MASS INDEX: 23.7 KG/M2 | DIASTOLIC BLOOD PRESSURE: 60 MMHG | WEIGHT: 138.8 LBS | OXYGEN SATURATION: 99 % | RESPIRATION RATE: 16 BRPM | HEART RATE: 92 BPM | TEMPERATURE: 98.4 F

## 2021-05-10 DIAGNOSIS — F90.9 ADULT ADHD: ICD-10-CM

## 2021-05-10 PROCEDURE — 99213 OFFICE O/P EST LOW 20 MIN: CPT | Performed by: FAMILY MEDICINE

## 2021-05-10 RX ORDER — DEXTROAMPHETAMINE SACCHARATE, AMPHETAMINE ASPARTATE MONOHYDRATE, DEXTROAMPHETAMINE SULFATE AND AMPHETAMINE SULFATE 5; 5; 5; 5 MG/1; MG/1; MG/1; MG/1
20 CAPSULE, EXTENDED RELEASE ORAL EVERY MORNING
Qty: 30 CAPSULE | Refills: 0 | Status: SHIPPED | OUTPATIENT
Start: 2021-05-10 | End: 2021-06-08 | Stop reason: SDUPTHER

## 2021-06-02 ENCOUNTER — OFFICE VISIT (OUTPATIENT)
Dept: FAMILY MEDICINE CLINIC | Facility: CLINIC | Age: 26
End: 2021-06-02

## 2021-06-02 VITALS
HEIGHT: 64 IN | OXYGEN SATURATION: 99 % | DIASTOLIC BLOOD PRESSURE: 75 MMHG | BODY MASS INDEX: 23.39 KG/M2 | RESPIRATION RATE: 18 BRPM | HEART RATE: 82 BPM | SYSTOLIC BLOOD PRESSURE: 111 MMHG | WEIGHT: 137 LBS | TEMPERATURE: 98.7 F

## 2021-06-02 DIAGNOSIS — F90.9 ADULT ADHD: Primary | ICD-10-CM

## 2021-06-02 DIAGNOSIS — F41.9 ANXIETY: ICD-10-CM

## 2021-06-02 DIAGNOSIS — E55.9 VITAMIN D DEFICIENCY: ICD-10-CM

## 2021-06-02 PROCEDURE — 99212 OFFICE O/P EST SF 10 MIN: CPT | Performed by: NURSE PRACTITIONER

## 2021-06-02 NOTE — PROGRESS NOTES
"Chief Complaint  Impaired attention (follow up)    Subjective          Mi Whitley presents to [facility] for follow up on chronic illnesses.   History of Present Illness  She is feeling good.   She reports her adhd is very stable with adderall currently being treated with Dr. Belle. She reports all other problems have improved since treatment of her adhd. Her anxiety is much better now.       Objective   Vital Signs:   /75 (BP Location: Left arm, Patient Position: Sitting, Cuff Size: Adult)   Pulse 82   Temp 98.7 °F (37.1 °C) (Infrared)   Resp 18   Ht 161.3 cm (63.5\") Comment: per patient  Wt 62.1 kg (137 lb)   SpO2 99%   BMI 23.89 kg/m²     Physical Exam  Vitals and nursing note reviewed.   Constitutional:       Appearance: She is well-developed.   HENT:      Head: Normocephalic and atraumatic.   Eyes:      Conjunctiva/sclera: Conjunctivae normal.   Cardiovascular:      Rate and Rhythm: Normal rate and regular rhythm.      Pulses: Normal pulses.      Heart sounds: Normal heart sounds.   Pulmonary:      Effort: Pulmonary effort is normal.      Breath sounds: Normal breath sounds.   Musculoskeletal:      Cervical back: Neck supple.   Lymphadenopathy:      Cervical: No cervical adenopathy.   Skin:     General: Skin is warm and dry.   Neurological:      Mental Status: She is alert and oriented to person, place, and time.   Psychiatric:         Mood and Affect: Mood normal.         Behavior: Behavior normal.         Thought Content: Thought content normal.         Judgment: Judgment normal.        Result Review :                 Assessment and Plan    Diagnoses and all orders for this visit:    1. Adult ADHD (Primary)    2. Anxiety    3. Vitamin D deficiency      Plan:  Continue all current treatment   Continue follow up with Dr. Belle for adhd management  Follow up if any problems arise        Follow Up   Return in about 1 year (around 6/2/2022) for Annual physical, " Recheck.  Patient was given instructions and counseling regarding her condition or for health maintenance advice. Please see specific information pulled into the AVS if appropriate.

## 2021-06-08 DIAGNOSIS — F90.9 ADULT ADHD: ICD-10-CM

## 2021-06-09 RX ORDER — DEXTROAMPHETAMINE SACCHARATE, AMPHETAMINE ASPARTATE MONOHYDRATE, DEXTROAMPHETAMINE SULFATE AND AMPHETAMINE SULFATE 5; 5; 5; 5 MG/1; MG/1; MG/1; MG/1
20 CAPSULE, EXTENDED RELEASE ORAL EVERY MORNING
Qty: 30 CAPSULE | Refills: 0 | Status: SHIPPED | OUTPATIENT
Start: 2021-06-09 | End: 2021-07-07 | Stop reason: SDUPTHER

## 2021-07-07 DIAGNOSIS — F90.9 ADULT ADHD: ICD-10-CM

## 2021-07-08 RX ORDER — DEXTROAMPHETAMINE SACCHARATE, AMPHETAMINE ASPARTATE MONOHYDRATE, DEXTROAMPHETAMINE SULFATE AND AMPHETAMINE SULFATE 5; 5; 5; 5 MG/1; MG/1; MG/1; MG/1
20 CAPSULE, EXTENDED RELEASE ORAL EVERY MORNING
Qty: 30 CAPSULE | Refills: 0 | Status: SHIPPED | OUTPATIENT
Start: 2021-07-08 | End: 2021-08-10 | Stop reason: SDUPTHER

## 2021-08-03 NOTE — TELEPHONE ENCOUNTER
Caller: Mi Whitley    Relationship to patient: Self    Best call back number:  [phone]     Patient is needing:  Pt states that she is going to run out of medication prior to 8/10 when she scheduled her appt. She will run out Monday. There is no sooner appt w/ .  She is asking if this medication will cause withdrawals if she does not take it for a day. Is it possible for a short prescription to be sent to pharmacy to last until apt so she doesn't miss a dose?     Pt would like a call back.   If possible, she would like to come in during the late afternoon this week and be seen sooner, she likes appts around 330.

## 2021-08-06 RX ORDER — FAMOTIDINE 20 MG/1
20 TABLET, FILM COATED ORAL 2 TIMES DAILY
COMMUNITY
Start: 2021-06-29

## 2021-08-10 ENCOUNTER — OFFICE VISIT (OUTPATIENT)
Dept: FAMILY MEDICINE CLINIC | Facility: CLINIC | Age: 26
End: 2021-08-10

## 2021-08-10 VITALS
BODY MASS INDEX: 24.34 KG/M2 | WEIGHT: 137.4 LBS | TEMPERATURE: 97.3 F | SYSTOLIC BLOOD PRESSURE: 116 MMHG | HEART RATE: 86 BPM | OXYGEN SATURATION: 97 % | RESPIRATION RATE: 16 BRPM | DIASTOLIC BLOOD PRESSURE: 72 MMHG | HEIGHT: 63 IN

## 2021-08-10 DIAGNOSIS — F90.9 ADULT ADHD: Primary | ICD-10-CM

## 2021-08-10 DIAGNOSIS — L50.9 HIVES: ICD-10-CM

## 2021-08-10 PROCEDURE — 99213 OFFICE O/P EST LOW 20 MIN: CPT | Performed by: FAMILY MEDICINE

## 2021-08-10 RX ORDER — METHYLPREDNISOLONE 4 MG/1
TABLET ORAL
Qty: 21 TABLET | Refills: 0 | Status: SHIPPED | OUTPATIENT
Start: 2021-08-10 | End: 2021-08-17

## 2021-08-10 RX ORDER — DEXTROAMPHETAMINE SACCHARATE, AMPHETAMINE ASPARTATE MONOHYDRATE, DEXTROAMPHETAMINE SULFATE AND AMPHETAMINE SULFATE 5; 5; 5; 5 MG/1; MG/1; MG/1; MG/1
20 CAPSULE, EXTENDED RELEASE ORAL EVERY MORNING
Qty: 30 CAPSULE | Refills: 0 | Status: SHIPPED | OUTPATIENT
Start: 2021-08-10 | End: 2021-08-17 | Stop reason: SDUPTHER

## 2021-08-10 NOTE — PROGRESS NOTES
"Subjective cc: ADHD   Mi Whitley is a 26 y.o. female who presents for follow up on ADHD.  She is doing well on current medication.  No change in appetite.  Sleeping well.  No heart palpitationsAnswers for HPI/ROS submitted by the patient on 8/9/2021  Please describe your symptoms.: Needing Refill on adderall.  Have you had these symptoms before?: Yes  How long have you been having these symptoms?: 1-4 days  What is the primary reason for your visit?: Other      She reports intermittently she will get hives and itching - has happened in the past - she usually has to get a steroid to help with it and cut down on the reaction.      History of Present Illness     The following portions of the patient's history were reviewed and updated as appropriate: allergies, current medications, past family history, past medical history, past social history, past surgical history and problem list.        Review of Systems   Skin: Positive for rash.   Psychiatric/Behavioral: Positive for decreased concentration. Negative for sleep disturbance. The patient is nervous/anxious (improving).    All other systems reviewed and are negative.      Objective   Blood pressure 116/72, pulse 86, temperature 97.3 °F (36.3 °C), temperature source Infrared, resp. rate 16, height 160 cm (63\"), weight 62.3 kg (137 lb 6.4 oz), SpO2 97 %, not currently breastfeeding.  Physical Exam  Vitals and nursing note reviewed.   Constitutional:       General: She is not in acute distress.     Appearance: She is well-developed. She is not diaphoretic.   HENT:      Head: Normocephalic and atraumatic.      Right Ear: External ear normal.      Left Ear: External ear normal.      Nose: Nose normal.   Eyes:      General:         Right eye: No discharge.         Left eye: No discharge.      Conjunctiva/sclera: Conjunctivae normal.   Neck:      Thyroid: No thyromegaly.      Trachea: No tracheal deviation.   Cardiovascular:      Rate and Rhythm: Normal rate and regular " rhythm.      Heart sounds: Normal heart sounds.   Pulmonary:      Effort: Pulmonary effort is normal. No respiratory distress.      Breath sounds: Normal breath sounds. No stridor. No wheezing.   Chest:      Chest wall: No tenderness.   Abdominal:      General: There is no distension.      Palpations: Abdomen is soft.      Tenderness: There is no abdominal tenderness.   Musculoskeletal:      Cervical back: Normal range of motion.   Lymphadenopathy:      Cervical: No cervical adenopathy.   Skin:     General: Skin is warm and dry.   Neurological:      Mental Status: She is alert and oriented to person, place, and time.      Motor: No abnormal muscle tone.      Coordination: Coordination normal.   Psychiatric:         Behavior: Behavior normal.         Thought Content: Thought content normal.         Judgment: Judgment normal.         Assessment/Plan   Problems Addressed this Visit     None      Visit Diagnoses     Adult ADHD    -  Primary    Relevant Medications    amphetamine-dextroamphetamine XR (ADDERALL XR) 20 MG 24 hr capsule    Hives        Relevant Medications    methylPREDNISolone (MEDROL) 4 MG dose pack      Diagnoses       Codes Comments    Adult ADHD    -  Primary ICD-10-CM: F90.9  ICD-9-CM: 314.01     Hives     ICD-10-CM: L50.9  ICD-9-CM: 708.9         Plan:    1.  Adult ADHD: Chronic, stable.  Continue on current medication.  No side effects.  Carlos reviewed.  Controlled contract in the chart.  UDS up-to-date.  Refill on medication.  Recheck in 3 months.    2.  Hives: New, uncontrolled.  Prescription given for Medrol Dosepak.  Advised to start on steroid pack if hives return.  Advised on risk and benefits of taking steroid and Adderall at the same time as it might increase heart palpitations.  Patient understands.          This document has been electronically signed by Mi Belle MD on August 10, 2021 12:36 CDT

## 2021-08-17 ENCOUNTER — OFFICE VISIT (OUTPATIENT)
Dept: FAMILY MEDICINE CLINIC | Facility: CLINIC | Age: 26
End: 2021-08-17

## 2021-08-17 VITALS
WEIGHT: 138 LBS | HEIGHT: 63 IN | HEART RATE: 81 BPM | DIASTOLIC BLOOD PRESSURE: 68 MMHG | BODY MASS INDEX: 24.45 KG/M2 | TEMPERATURE: 96.9 F | RESPIRATION RATE: 16 BRPM | SYSTOLIC BLOOD PRESSURE: 110 MMHG | OXYGEN SATURATION: 96 %

## 2021-08-17 DIAGNOSIS — F90.9 ADULT ADHD: ICD-10-CM

## 2021-08-17 PROCEDURE — 99213 OFFICE O/P EST LOW 20 MIN: CPT | Performed by: FAMILY MEDICINE

## 2021-08-17 RX ORDER — DEXTROAMPHETAMINE SACCHARATE, AMPHETAMINE ASPARTATE MONOHYDRATE, DEXTROAMPHETAMINE SULFATE AND AMPHETAMINE SULFATE 5; 5; 5; 5 MG/1; MG/1; MG/1; MG/1
20 CAPSULE, EXTENDED RELEASE ORAL EVERY MORNING
Qty: 30 CAPSULE | Refills: 0 | Status: SHIPPED | OUTPATIENT
Start: 2021-08-17 | End: 2021-09-09

## 2021-08-17 NOTE — PROGRESS NOTES
"Subjective cc: ADHD  Mi Whitley is a 26 y.o. female who presents to discuss side effects from adderall medication.   She reports that when taking the generic adderall and she was noticing numbness and tingling on her face - she though it was coming from her back, was going to chiropractor - not helping, she then noticed when taking her medication it seemed to be worse. When she was taking hte brand name medicaiton she was not noticing these effects. She also complains of some pain in her left arm where her nexplanon implant used to be - reports that area has always been sensitive. She is not currently having the pain - she was not having that on the brand name medication. Occasionally she would notice the pain if her anxiety got really high - reports it is not severe and it goes away on its own quickly - sharp shooting pain. No pain with exertion.     History of Present Illness     The following portions of the patient's history were reviewed and updated as appropriate: allergies, current medications, past family history, past medical history, past social history, past surgical history and problem list.        Review of Systems   Musculoskeletal: Positive for myalgias.   All other systems reviewed and are negative.      Objective   Blood pressure 110/68, pulse 81, temperature 96.9 °F (36.1 °C), temperature source Infrared, resp. rate 16, height 160 cm (63\"), weight 62.6 kg (138 lb), SpO2 96 %, not currently breastfeeding.  Physical Exam  Vitals and nursing note reviewed.   Constitutional:       General: She is not in acute distress.     Appearance: She is well-developed. She is not diaphoretic.   HENT:      Head: Normocephalic and atraumatic.      Nose: Nose normal.   Eyes:      General:         Right eye: No discharge.         Left eye: No discharge.      Conjunctiva/sclera: Conjunctivae normal.   Neck:      Thyroid: No thyromegaly.      Trachea: No tracheal deviation.   Cardiovascular:      Rate and Rhythm: " Normal rate and regular rhythm.      Pulses: Normal pulses.      Heart sounds: Normal heart sounds.   Pulmonary:      Effort: Pulmonary effort is normal. No respiratory distress.      Breath sounds: Normal breath sounds. No stridor. No wheezing.   Chest:      Chest wall: No tenderness.   Musculoskeletal:         General: Normal range of motion.      Cervical back: Normal range of motion.   Lymphadenopathy:      Cervical: No cervical adenopathy.   Skin:     General: Skin is warm and dry.   Neurological:      Mental Status: She is alert and oriented to person, place, and time.      Motor: No abnormal muscle tone.      Coordination: Coordination normal.   Psychiatric:         Behavior: Behavior normal.         Thought Content: Thought content normal.         Judgment: Judgment normal.         Assessment/Plan   Problems Addressed this Visit     None      Visit Diagnoses     Adult ADHD        Relevant Medications    amphetamine-dextroamphetamine XR (ADDERALL XR) 20 MG 24 hr capsule      Diagnoses       Codes Comments    Adult ADHD     ICD-10-CM: F90.9  ICD-9-CM: 314.01         PLAN:     #1 ADHD: chronic, side effects to generic medication, will fill for brand name only, will call pharmacy and update, advised pt to return if continues to have side effects on new medication, advised on cardiac risks - pt does c/o left arm pain but seem to be mor ein location of her prior implant and not exertional chest pain. Advised her on red flag symptoms regarding cardiac chest pain and to stop meds and seek evaluation if these were to develop           This document has been electronically signed by Mi Belle MD on August 17, 2021 08:43 CDT

## 2021-08-18 ENCOUNTER — TELEPHONE (OUTPATIENT)
Dept: FAMILY MEDICINE CLINIC | Facility: CLINIC | Age: 26
End: 2021-08-18

## 2021-08-18 NOTE — TELEPHONE ENCOUNTER
PATIENT CALLED IN STATING THAT SHE NEEDS A PRIOR AUTHORIZATION FOR THE       amphetamine-dextroamphetamine XR (ADDERALL XR) 20 MG 24 hr capsule    GOOD CALL BACK   [phone]

## 2021-08-24 ENCOUNTER — TELEPHONE (OUTPATIENT)
Dept: FAMILY MEDICINE CLINIC | Facility: CLINIC | Age: 26
End: 2021-08-24

## 2021-08-25 NOTE — TELEPHONE ENCOUNTER
Please see phone note from 8/24/2021.  Note has been sent to Dr. Belle to review and possibly make medication change due to patients copay.  Left message for patient to return call.

## 2021-09-08 ENCOUNTER — TELEPHONE (OUTPATIENT)
Dept: FAMILY MEDICINE CLINIC | Facility: CLINIC | Age: 26
End: 2021-09-08

## 2021-09-08 NOTE — TELEPHONE ENCOUNTER
Caller: Mi Whitley    Relationship to patient: Self    Best call back number: [phone]     Patient is needing: PATIENT STATES THAT THE INSURANCE COMPANY NEVER GOT A PA FOR amphetamine-dextroamphetamine XR (ADDERALL XR) 20 MG 24 hr capsule. PATIENT IS WANTING TO KNOW WHAT NEEDS TO BE DONE. PATIENT WOULD LIKE A CALL TO DISCUSS.

## 2021-09-09 DIAGNOSIS — F90.9 ADULT ADHD: Primary | ICD-10-CM

## 2021-09-09 RX ORDER — METHYLPHENIDATE HYDROCHLORIDE 27 MG/1
27 TABLET ORAL EVERY MORNING
Qty: 30 TABLET | Refills: 0 | Status: SHIPPED | OUTPATIENT
Start: 2021-09-09 | End: 2021-09-21 | Stop reason: SINTOL

## 2021-09-21 ENCOUNTER — TELEPHONE (OUTPATIENT)
Dept: FAMILY MEDICINE CLINIC | Facility: CLINIC | Age: 26
End: 2021-09-21

## 2021-09-21 DIAGNOSIS — F90.9 ADULT ADHD: Primary | ICD-10-CM

## 2021-09-21 RX ORDER — DEXTROAMPHETAMINE SACCHARATE, AMPHETAMINE ASPARTATE MONOHYDRATE, DEXTROAMPHETAMINE SULFATE AND AMPHETAMINE SULFATE 5; 5; 5; 5 MG/1; MG/1; MG/1; MG/1
20 CAPSULE, EXTENDED RELEASE ORAL EVERY MORNING
Qty: 30 CAPSULE | Refills: 0 | Status: SHIPPED | OUTPATIENT
Start: 2021-09-21 | End: 2021-10-18 | Stop reason: SDUPTHER

## 2021-09-21 NOTE — TELEPHONE ENCOUNTER
Caller: Mi Whitley    Relationship to patient: Self    Best call back number: [phone]      Patient is needing:  Pt states that she is having side effects from Concerta. She gets a headache, fatigue, and anxious. She is also experiencing mood swings. She tried taking Concerta for about a week and the side effects have not improved.     She wants to go back to [facility].

## 2021-09-21 NOTE — TELEPHONE ENCOUNTER
Pharmacy Name:  [facility]     Pharmacy representative name: LEI     Pharmacy representative phone number: [phone]    What medication are you calling in regards to: STATES TO PLEASE DISREGARD THE FAX REQUEST FOR ADDERALL     What question does the pharmacy have: PLEASE DISREGARD FAX FOR MEDICATION OF ADDERALL     Who is the provider that prescribed the medication: ADDERALL     Additional notes: PATIENT WILL PAY FOR THE MEDICATION

## 2021-10-18 DIAGNOSIS — F90.9 ADULT ADHD: ICD-10-CM

## 2021-10-18 NOTE — TELEPHONE ENCOUNTER
Rx Refill Note  Requested Prescriptions     Pending Prescriptions Disp Refills   • amphetamine-dextroamphetamine XR (Adderall XR) 20 MG 24 hr capsule 30 capsule 0     Sig: Take 1 capsule by mouth Every Morning      Last office visit with prescribing clinician: 8/17/2021      Next office visit with prescribing clinician: 11/16/2021     LRX:9/21/21-1 month    23}  Sunni Snell MA  10/18/21, 10:34 CDT

## 2021-10-19 RX ORDER — DEXTROAMPHETAMINE SACCHARATE, AMPHETAMINE ASPARTATE MONOHYDRATE, DEXTROAMPHETAMINE SULFATE AND AMPHETAMINE SULFATE 5; 5; 5; 5 MG/1; MG/1; MG/1; MG/1
20 CAPSULE, EXTENDED RELEASE ORAL EVERY MORNING
Qty: 30 CAPSULE | Refills: 0 | Status: SHIPPED | OUTPATIENT
Start: 2021-10-19 | End: 2021-11-16 | Stop reason: DRUGHIGH

## 2021-11-16 ENCOUNTER — OFFICE VISIT (OUTPATIENT)
Dept: FAMILY MEDICINE CLINIC | Facility: CLINIC | Age: 26
End: 2021-11-16

## 2021-11-16 VITALS
OXYGEN SATURATION: 99 % | HEART RATE: 72 BPM | TEMPERATURE: 98.6 F | HEIGHT: 63 IN | RESPIRATION RATE: 16 BRPM | WEIGHT: 136.6 LBS | SYSTOLIC BLOOD PRESSURE: 115 MMHG | BODY MASS INDEX: 24.2 KG/M2 | DIASTOLIC BLOOD PRESSURE: 76 MMHG

## 2021-11-16 DIAGNOSIS — F32.A ANXIETY AND DEPRESSION: ICD-10-CM

## 2021-11-16 DIAGNOSIS — F41.9 ANXIETY AND DEPRESSION: ICD-10-CM

## 2021-11-16 DIAGNOSIS — R00.2 PALPITATIONS: ICD-10-CM

## 2021-11-16 DIAGNOSIS — Z79.899 MEDICATION MANAGEMENT: ICD-10-CM

## 2021-11-16 DIAGNOSIS — F90.9 ADULT ADHD: Primary | ICD-10-CM

## 2021-11-16 PROCEDURE — 99214 OFFICE O/P EST MOD 30 MIN: CPT | Performed by: FAMILY MEDICINE

## 2021-11-16 RX ORDER — DEXTROAMPHETAMINE SACCHARATE, AMPHETAMINE ASPARTATE MONOHYDRATE, DEXTROAMPHETAMINE SULFATE AND AMPHETAMINE SULFATE 3.75; 3.75; 3.75; 3.75 MG/1; MG/1; MG/1; MG/1
15 CAPSULE, EXTENDED RELEASE ORAL EVERY MORNING
Qty: 30 CAPSULE | Refills: 0 | Status: SHIPPED | OUTPATIENT
Start: 2021-11-16 | End: 2021-12-15 | Stop reason: SDUPTHER

## 2021-11-16 RX ORDER — FLUOXETINE HYDROCHLORIDE 20 MG/1
20 CAPSULE ORAL DAILY
Qty: 90 CAPSULE | Refills: 0 | Status: SHIPPED | OUTPATIENT
Start: 2021-11-16 | End: 2022-02-17 | Stop reason: SDUPTHER

## 2021-11-20 LAB — DRUGS UR: NORMAL

## 2021-11-22 ENCOUNTER — PATIENT MESSAGE (OUTPATIENT)
Dept: FAMILY MEDICINE CLINIC | Facility: CLINIC | Age: 26
End: 2021-11-22

## 2021-11-23 NOTE — TELEPHONE ENCOUNTER
From: DION ROMO  To: Mi Whitley  Sent: 11/22/2021 10:38 AM CST  Subject: Medication coverage    Mi:    I have been working with your insurance company to cover the name brand Adderall. Unfortunately they will not cover the costs, due to generic brand being available-even with the side effects you have when taking the generic. Please advise if you want to continue to pay out of pocket for name brand medication, or if you would like to try another medication that insurance will cover.    Thank you!    DION Moeller

## 2021-11-30 ENCOUNTER — HOSPITAL ENCOUNTER (OUTPATIENT)
Dept: CARDIOLOGY | Facility: HOSPITAL | Age: 26
Discharge: HOME | End: 2021-11-30
Admitting: FAMILY MEDICINE

## 2021-11-30 DIAGNOSIS — R00.2 PALPITATIONS: ICD-10-CM

## 2021-11-30 PROCEDURE — 93246 EXT ECG>7D<15D RECORDING: CPT

## 2021-12-15 DIAGNOSIS — F90.9 ADULT ADHD: ICD-10-CM

## 2021-12-16 NOTE — TELEPHONE ENCOUNTER
Rx Refill Note  Requested Prescriptions     Pending Prescriptions Disp Refills   • amphetamine-dextroamphetamine XR (Adderall XR) 15 MG 24 hr capsule 30 capsule 0     Sig: Take 1 capsule by mouth Every Morning      Last office visit with prescribing clinician: 11/16/2021      Next office visit with prescribing clinician: 12/21/2021  Last RX: 11.16.2021  Josh Latif MA  12/16/21, 16:38 CST

## 2021-12-17 RX ORDER — DEXTROAMPHETAMINE SACCHARATE, AMPHETAMINE ASPARTATE MONOHYDRATE, DEXTROAMPHETAMINE SULFATE AND AMPHETAMINE SULFATE 3.75; 3.75; 3.75; 3.75 MG/1; MG/1; MG/1; MG/1
15 CAPSULE, EXTENDED RELEASE ORAL EVERY MORNING
Qty: 30 CAPSULE | Refills: 0 | Status: SHIPPED | OUTPATIENT
Start: 2021-12-17 | End: 2022-01-19 | Stop reason: SDUPTHER

## 2021-12-21 ENCOUNTER — OFFICE VISIT (OUTPATIENT)
Dept: FAMILY MEDICINE CLINIC | Facility: CLINIC | Age: 26
End: 2021-12-21

## 2021-12-21 VITALS
SYSTOLIC BLOOD PRESSURE: 121 MMHG | HEART RATE: 86 BPM | BODY MASS INDEX: 23.32 KG/M2 | OXYGEN SATURATION: 98 % | HEIGHT: 64 IN | WEIGHT: 136.6 LBS | TEMPERATURE: 98.2 F | DIASTOLIC BLOOD PRESSURE: 82 MMHG

## 2021-12-21 DIAGNOSIS — G44.209 ACUTE NON INTRACTABLE TENSION-TYPE HEADACHE: ICD-10-CM

## 2021-12-21 DIAGNOSIS — F90.9 ADULT ADHD: Primary | ICD-10-CM

## 2021-12-21 DIAGNOSIS — F41.9 ANXIETY AND DEPRESSION: ICD-10-CM

## 2021-12-21 DIAGNOSIS — F32.A ANXIETY AND DEPRESSION: ICD-10-CM

## 2021-12-21 PROCEDURE — 99214 OFFICE O/P EST MOD 30 MIN: CPT | Performed by: FAMILY MEDICINE

## 2021-12-21 NOTE — PROGRESS NOTES
"Subjective cc: ADHD   Mi Whitley is a 26 y.o. female with ADHD who presents for follow up.  She was unable to afford the cost of the name brand medication this month - tried the generic - trying it now - she is concerned about developing palpitations - she is interested in maybe trying a different  at a different pharmacy     She does feel like the prozac is working for her -  Would like to continue     Reports she does grind her teeth - wears a mouth guard - has seen dentist in last 4 months for evaluation   She does get some intermittent headaches   Reports [facility] was supposed to follow up with ENT as well .     History of Present Illness     The following portions of the patient's history were reviewed and updated as appropriate: allergies, current medications, past family history, past medical history, past social history, past surgical history and problem list.        Review of Systems   Neurological: Positive for light-headedness and headaches.   All other systems reviewed and are negative.      Objective   Blood pressure 121/82, pulse 86, temperature 98.2 °F (36.8 °C), height 161.3 cm (63.5\"), weight 62 kg (136 lb 9.6 oz), last menstrual period 12/11/2021, SpO2 98 %, not currently breastfeeding.  Physical Exam  Vitals and nursing note reviewed.   Constitutional:       General: She is not in acute distress.     Appearance: She is well-developed. She is not diaphoretic.   HENT:      Head: Normocephalic and atraumatic.      Right Ear: Tympanic membrane, ear canal and external ear normal.      Left Ear: Tympanic membrane, ear canal and external ear normal.      Nose: Nose normal.      Mouth/Throat:      Mouth: Mucous membranes are moist.      Pharynx: No oropharyngeal exudate or posterior oropharyngeal erythema.   Eyes:      General:         Right eye: No discharge.         Left eye: No discharge.      Conjunctiva/sclera: Conjunctivae normal.   Neck:      Thyroid: No thyromegaly.      Trachea: No " tracheal deviation.   Cardiovascular:      Rate and Rhythm: Normal rate and regular rhythm.      Heart sounds: Normal heart sounds.   Pulmonary:      Effort: Pulmonary effort is normal. No respiratory distress.      Breath sounds: Normal breath sounds. No stridor. No wheezing.   Chest:      Chest wall: No tenderness.   Musculoskeletal:         General: Normal range of motion.      Cervical back: Normal range of motion.   Lymphadenopathy:      Cervical: No cervical adenopathy.   Skin:     General: Skin is warm and dry.   Neurological:      Mental Status: She is alert and oriented to person, place, and time.      Motor: No abnormal muscle tone.      Coordination: Coordination normal.   Psychiatric:         Behavior: Behavior normal.         Thought Content: Thought content normal.         Judgment: Judgment normal.         Assessment/Plan   Problems Addressed this Visit     None      Visit Diagnoses     Adult ADHD    -  Primary    Anxiety and depression        Acute non intractable tension-type headache          Diagnoses       Codes Comments    Adult ADHD    -  Primary ICD-10-CM: F90.9  ICD-9-CM: 314.01     Anxiety and depression     ICD-10-CM: F41.9, F32.A  ICD-9-CM: 300.00, 311     Acute non intractable tension-type headache     ICD-10-CM: G44.209  ICD-9-CM: 339.10         PLAN:     #1 Adult ADHD: chronic, stable, trying the generic due to cost - however if she notices side effects would like trying to the the generic from a different pharmacy for a different  to see if tolerates it better. If not will go back to brand name. candido reviewed, controlled contract in chart, advised on risks and benefits of medications     #2 anxiety/depression: chronic, stable, continue on current medications     #3 headaches: new, monitor symtpms, call if not improving           This document has been electronically signed by Mi Belle MD on December 21, 2021 13:58 CST

## 2021-12-27 LAB
MAXIMAL PREDICTED HEART RATE: 195 BPM
STRESS TARGET HR: 166 BPM

## 2021-12-27 PROCEDURE — 93248 EXT ECG>7D<15D REV&INTERPJ: CPT | Performed by: INTERNAL MEDICINE

## 2022-01-11 ENCOUNTER — TELEPHONE (OUTPATIENT)
Dept: FAMILY MEDICINE CLINIC | Facility: CLINIC | Age: 27
End: 2022-01-11

## 2022-01-11 NOTE — TELEPHONE ENCOUNTER
Caller: ORSIE ESQUIVEL    Relationship: Mother    Best call back number: [phone]    Who are you requesting to speak with     CLINICAL STAFF      What was the call regarding:    POSITIVE FOR COVID, THROAT HURTS THAT IT FEELS LIKE STREP THROAT.    Do you require a callback:    YES, PLEASE ADVISE      [facility] [address] [place] [address] [phone] [facility] [phone] FX

## 2022-01-11 NOTE — TELEPHONE ENCOUNTER
Called pt's mother- Evie, back. Pt will need apt, preferably mychart video visit.  No answer- LVM

## 2022-01-13 NOTE — TELEPHONE ENCOUNTER
Called pt to check in to see if apt is still needed- pt states that she is feeling better, and not apt needed now.

## 2022-01-19 DIAGNOSIS — F90.9 ADULT ADHD: ICD-10-CM

## 2022-01-20 NOTE — TELEPHONE ENCOUNTER
Rx Refill Note  Requested Prescriptions     Pending Prescriptions Disp Refills   • amphetamine-dextroamphetamine XR (Adderall XR) 15 MG 24 hr capsule 30 capsule 0     Sig: Take 1 capsule by mouth Every Morning      Last office visit with prescribing clinician: 12/21/2021      Next office visit with prescribing clinician: 3/24/2022     Last UDS/Contract-11/16/21    {TIP  Is Refill Pharmacy correct?-  Pharmacy has been updated to [facility] at patient's request.     Ina Mazariegos CMA  01/20/22, 10:40 CST

## 2022-01-21 RX ORDER — DEXTROAMPHETAMINE SACCHARATE, AMPHETAMINE ASPARTATE MONOHYDRATE, DEXTROAMPHETAMINE SULFATE AND AMPHETAMINE SULFATE 3.75; 3.75; 3.75; 3.75 MG/1; MG/1; MG/1; MG/1
15 CAPSULE, EXTENDED RELEASE ORAL EVERY MORNING
Qty: 30 CAPSULE | Refills: 0 | Status: SHIPPED | OUTPATIENT
Start: 2022-01-21 | End: 2022-02-21 | Stop reason: SDUPTHER

## 2022-02-17 DIAGNOSIS — F32.A ANXIETY AND DEPRESSION: ICD-10-CM

## 2022-02-17 DIAGNOSIS — F41.9 ANXIETY AND DEPRESSION: ICD-10-CM

## 2022-02-18 RX ORDER — FLUOXETINE HYDROCHLORIDE 20 MG/1
20 CAPSULE ORAL DAILY
Qty: 90 CAPSULE | Refills: 1 | Status: SHIPPED | OUTPATIENT
Start: 2022-02-18 | End: 2022-08-23 | Stop reason: SDUPTHER

## 2022-02-18 NOTE — TELEPHONE ENCOUNTER
Rx Refill Note  Requested Prescriptions     Pending Prescriptions Disp Refills   • FLUoxetine (PROzac) 20 MG capsule 90 capsule 0     Sig: Take 1 capsule by mouth Daily.      Last office visit with prescribing clinician: 12/21/2021      Next office visit with prescribing clinician: 3/24/2022            Dorota Barajas MA  02/18/22, 09:06 CST

## 2022-02-21 DIAGNOSIS — F90.9 ADULT ADHD: ICD-10-CM

## 2022-02-22 NOTE — TELEPHONE ENCOUNTER
Rx Refill Note  Requested Prescriptions     Pending Prescriptions Disp Refills   • amphetamine-dextroamphetamine XR (Adderall XR) 15 MG 24 hr capsule 30 capsule 0     Sig: Take 1 capsule by mouth Every Morning      Last office visit with prescribing clinician: 12/21/2021      Next office visit with prescribing clinician: 3/24/2022            Dorota Barajas MA  02/22/22, 08:16 CST

## 2022-02-23 RX ORDER — DEXTROAMPHETAMINE SACCHARATE, AMPHETAMINE ASPARTATE MONOHYDRATE, DEXTROAMPHETAMINE SULFATE AND AMPHETAMINE SULFATE 3.75; 3.75; 3.75; 3.75 MG/1; MG/1; MG/1; MG/1
15 CAPSULE, EXTENDED RELEASE ORAL EVERY MORNING
Qty: 30 CAPSULE | Refills: 0 | Status: SHIPPED | OUTPATIENT
Start: 2022-02-23 | End: 2022-03-15

## 2022-03-15 ENCOUNTER — OFFICE VISIT (OUTPATIENT)
Dept: FAMILY MEDICINE CLINIC | Facility: CLINIC | Age: 27
End: 2022-03-15

## 2022-03-15 VITALS
SYSTOLIC BLOOD PRESSURE: 114 MMHG | HEIGHT: 64 IN | WEIGHT: 134.2 LBS | HEART RATE: 77 BPM | TEMPERATURE: 98 F | RESPIRATION RATE: 16 BRPM | BODY MASS INDEX: 22.91 KG/M2 | OXYGEN SATURATION: 98 % | DIASTOLIC BLOOD PRESSURE: 76 MMHG

## 2022-03-15 DIAGNOSIS — F90.9 ADULT ADHD: Primary | ICD-10-CM

## 2022-03-15 PROCEDURE — 99213 OFFICE O/P EST LOW 20 MIN: CPT | Performed by: FAMILY MEDICINE

## 2022-03-15 RX ORDER — DEXTROAMPHETAMINE SACCHARATE, AMPHETAMINE ASPARTATE MONOHYDRATE, DEXTROAMPHETAMINE SULFATE AND AMPHETAMINE SULFATE 5; 5; 5; 5 MG/1; MG/1; MG/1; MG/1
20 CAPSULE, EXTENDED RELEASE ORAL EVERY MORNING
Qty: 30 CAPSULE | Refills: 0 | Status: SHIPPED | OUTPATIENT
Start: 2022-03-15 | End: 2022-04-12 | Stop reason: SDUPTHER

## 2022-03-15 RX ORDER — EPINEPHRINE 0.3 MG/.3ML
0.3 INJECTION SUBCUTANEOUS ONCE
Qty: 1 EACH | Refills: 1 | Status: SHIPPED | OUTPATIENT
Start: 2022-03-15 | End: 2022-03-15

## 2022-03-15 NOTE — PROGRESS NOTES
"Subjective cc: ADHD   Mi Whitley is a 26 y.o. female with ADHD who presnets for follow up.     medicaiton is working but wears off in the afternoon around 2pm - she works until 5 and then tries to study in the evenings so it makes it hard to focus. She is interested in increasing dose - however she had palpitations when on the 20mg in the past. She has been on the 15mg since nov of 2021 - discussed tolerance and may be okay to trial increased dose at this time     She is also having increasing anxiety recently because of school and finals - these will end in may      History of Present Illness     The following portions of the patient's history were reviewed and updated as appropriate: allergies, current medications, past family history, past medical history, past social history, past surgical history and problem list.        Review of Systems   Cardiovascular: Negative for palpitations.   Psychiatric/Behavioral: Positive for decreased concentration. The patient is nervous/anxious.    All other systems reviewed and are negative.      Objective   Blood pressure 114/76, pulse 77, temperature 98 °F (36.7 °C), temperature source Infrared, resp. rate 16, height 161.3 cm (63.5\"), weight 60.9 kg (134 lb 3.2 oz), SpO2 98 %, not currently breastfeeding.  Physical Exam  Vitals and nursing note reviewed.   Constitutional:       General: She is not in acute distress.     Appearance: She is well-developed. She is not diaphoretic.   HENT:      Head: Normocephalic and atraumatic.      Right Ear: External ear normal.      Left Ear: External ear normal.      Nose: Nose normal.   Eyes:      General:         Right eye: No discharge.         Left eye: No discharge.      Conjunctiva/sclera: Conjunctivae normal.   Neck:      Thyroid: No thyromegaly.      Trachea: No tracheal deviation.   Cardiovascular:      Rate and Rhythm: Normal rate and regular rhythm.      Heart sounds: Normal heart sounds.   Pulmonary:      Effort: Pulmonary " effort is normal. No respiratory distress.      Breath sounds: Normal breath sounds. No stridor. No wheezing.   Chest:      Chest wall: No tenderness.   Musculoskeletal:         General: Normal range of motion.      Cervical back: Normal range of motion.   Lymphadenopathy:      Cervical: No cervical adenopathy.   Skin:     General: Skin is warm and dry.   Neurological:      Mental Status: She is alert and oriented to person, place, and time.      Motor: No abnormal muscle tone.      Coordination: Coordination normal.   Psychiatric:         Behavior: Behavior normal.         Thought Content: Thought content normal.         Judgment: Judgment normal.         Assessment/Plan   Problems Addressed this Visit    None     Visit Diagnoses     Adult ADHD    -  Primary    Relevant Medications    amphetamine-dextroamphetamine XR (Adderall XR) 20 MG 24 hr capsule      Diagnoses       Codes Comments    Adult ADHD    -  Primary ICD-10-CM: F90.9  ICD-9-CM: 314.01         PLAN:     #1 ADHD: chronic, uncontrolled, discussed the risks/benefits of increasing dose of medication - will trial increasing to 20mg daily - if develops palpitations or worsening anxiety pt to contact hte office and we will decrease back to 15 - however if it is helping can continue           This document has been electronically signed by Mi Belle MD on March 15, 2022 15:46 CDT

## 2022-04-12 DIAGNOSIS — F90.9 ADULT ADHD: ICD-10-CM

## 2022-04-12 RX ORDER — DEXTROAMPHETAMINE SACCHARATE, AMPHETAMINE ASPARTATE MONOHYDRATE, DEXTROAMPHETAMINE SULFATE AND AMPHETAMINE SULFATE 5; 5; 5; 5 MG/1; MG/1; MG/1; MG/1
20 CAPSULE, EXTENDED RELEASE ORAL EVERY MORNING
Qty: 30 CAPSULE | Refills: 0 | Status: SHIPPED | OUTPATIENT
Start: 2022-04-12 | End: 2022-04-12 | Stop reason: SDUPTHER

## 2022-04-12 RX ORDER — DEXTROAMPHETAMINE SACCHARATE, AMPHETAMINE ASPARTATE MONOHYDRATE, DEXTROAMPHETAMINE SULFATE AND AMPHETAMINE SULFATE 5; 5; 5; 5 MG/1; MG/1; MG/1; MG/1
20 CAPSULE, EXTENDED RELEASE ORAL EVERY MORNING
Qty: 30 CAPSULE | Refills: 0 | Status: SHIPPED | OUTPATIENT
Start: 2022-04-12 | End: 2022-05-10 | Stop reason: SDUPTHER

## 2022-04-12 NOTE — TELEPHONE ENCOUNTER
Rx Refill Note  Requested Prescriptions     Pending Prescriptions Disp Refills   • amphetamine-dextroamphetamine XR (Adderall XR) 20 MG 24 hr capsule 30 capsule 0     Sig: Take 1 capsule by mouth Every Morning      Last office visit with prescribing clinician: 3/15/2022      Next office visit with prescribing clinician: 6/14/2022    Last refill:3/15/2022    Anita Arizmendi MA  04/12/22, 08:17 CDT

## 2022-04-12 NOTE — TELEPHONE ENCOUNTER
PATIENT CALLED IN REQUESTING WE CANCEL THE PREVIOUS PRESCRIPTION AND RESEND HER     amphetamine-dextroamphetamine XR (Adderall XR) 20 MG 24 hr capsule    PLEASE ADD IT NEEDS TO BE THE TEVA BRAND     TO       [facility] [place], [place] - [address] - [phone]  - [phone]   [phone]          GOOD CALL BACK   [phone]

## 2022-04-12 NOTE — TELEPHONE ENCOUNTER
Rx Refill Note  Requested Prescriptions     Pending Prescriptions Disp Refills   • amphetamine-dextroamphetamine XR (Adderall XR) 20 MG 24 hr capsule 30 capsule 0     Sig: Take 1 capsule by mouth Every Morning Needs to be Teva brand per pt      Last office visit with prescribing clinician: 3/15/2022      Next office visit with prescribing clinician: 6/7/2022     Pt needs rx sent to different pharmacy due to brand-pended.    Called [facility] to cancel original rx- spoke with Verna to cancel.     Sunni Snell MA  04/12/22, 15:27 CDT

## 2022-05-10 DIAGNOSIS — F90.9 ADULT ADHD: ICD-10-CM

## 2022-05-10 NOTE — TELEPHONE ENCOUNTER
Contract 11/16/21  UDS 11/16/21  LRX 4/12/22    Rx Refill Note  Requested Prescriptions     Pending Prescriptions Disp Refills   • amphetamine-dextroamphetamine XR (Adderall XR) 20 MG 24 hr capsule 30 capsule 0     Sig: Take 1 capsule by mouth Every Morning Needs to be Teva brand per pt      Last office visit with prescribing clinician: 3/15/22  Next office visit with prescribing clinician: 6/7/22           Karolina Diego MA  05/10/22, 08:47 CDT

## 2022-05-12 RX ORDER — DEXTROAMPHETAMINE SACCHARATE, AMPHETAMINE ASPARTATE MONOHYDRATE, DEXTROAMPHETAMINE SULFATE AND AMPHETAMINE SULFATE 5; 5; 5; 5 MG/1; MG/1; MG/1; MG/1
20 CAPSULE, EXTENDED RELEASE ORAL EVERY MORNING
Qty: 30 CAPSULE | Refills: 0 | Status: SHIPPED | OUTPATIENT
Start: 2022-05-12 | End: 2022-06-13 | Stop reason: SDUPTHER

## 2022-06-13 ENCOUNTER — OFFICE VISIT (OUTPATIENT)
Dept: FAMILY MEDICINE CLINIC | Facility: CLINIC | Age: 27
End: 2022-06-13

## 2022-06-13 VITALS
OXYGEN SATURATION: 99 % | WEIGHT: 143 LBS | RESPIRATION RATE: 18 BRPM | BODY MASS INDEX: 24.41 KG/M2 | SYSTOLIC BLOOD PRESSURE: 110 MMHG | HEART RATE: 76 BPM | TEMPERATURE: 98.7 F | DIASTOLIC BLOOD PRESSURE: 76 MMHG | HEIGHT: 64 IN

## 2022-06-13 DIAGNOSIS — F90.9 ADULT ADHD: Primary | ICD-10-CM

## 2022-06-13 PROCEDURE — 99213 OFFICE O/P EST LOW 20 MIN: CPT | Performed by: FAMILY MEDICINE

## 2022-06-13 RX ORDER — DEXTROAMPHETAMINE SACCHARATE, AMPHETAMINE ASPARTATE MONOHYDRATE, DEXTROAMPHETAMINE SULFATE AND AMPHETAMINE SULFATE 5; 5; 5; 5 MG/1; MG/1; MG/1; MG/1
20 CAPSULE, EXTENDED RELEASE ORAL EVERY MORNING
Qty: 30 CAPSULE | Refills: 0 | Status: SHIPPED | OUTPATIENT
Start: 2022-06-13 | End: 2022-07-12 | Stop reason: SDUPTHER

## 2022-06-13 NOTE — PROGRESS NOTES
"Subjective cc: ADHD  Mi Whitley is a 26 y.o. female.     History of Present Illness     The patient presents today for a prescription for Adderall XR 20 mg once a day. She states that it is working well for her. The patient denies any side effects from the Adderall and states she would like to continue on the medication.    The following portions of the patient's history were reviewed and updated as appropriate: allergies, current medications, past family history, past medical history, past social history, past surgical history and problem list.        Review of Systems  A review of systems was performed, and the pertinent positives are noted in the HPI.    Objective   Blood pressure 110/76, pulse 76, temperature 98.7 °F (37.1 °C), temperature source Infrared, resp. rate 18, height 161.3 cm (63.5\"), weight 64.9 kg (143 lb), SpO2 99 %, not currently breastfeeding.  Physical Exam  Vitals and nursing note reviewed.   Constitutional:       General: She is not in acute distress.     Appearance: Normal appearance. She is well-developed. She is not ill-appearing, toxic-appearing or diaphoretic.   HENT:      Head: Normocephalic and atraumatic.      Right Ear: External ear normal.      Left Ear: External ear normal.   Eyes:      Conjunctiva/sclera: Conjunctivae normal.      Pupils: Pupils are equal, round, and reactive to light.   Neck:      Thyroid: No thyromegaly.      Vascular: No carotid bruit or JVD.   Cardiovascular:      Rate and Rhythm: Normal rate and regular rhythm.      Pulses: Normal pulses.      Heart sounds: Normal heart sounds. No murmur heard.  Pulmonary:      Effort: Pulmonary effort is normal. No respiratory distress.      Breath sounds: Normal breath sounds.   Abdominal:      General: Bowel sounds are normal.      Palpations: Abdomen is soft. There is no mass.      Tenderness: There is no abdominal tenderness.   Musculoskeletal:         General: No swelling. Normal range of motion.      Cervical back: " Normal range of motion and neck supple.   Lymphadenopathy:      Cervical: No cervical adenopathy.   Skin:     General: Skin is warm and dry.      Findings: No lesion or rash.   Neurological:      Mental Status: She is alert and oriented to person, place, and time.      Cranial Nerves: No cranial nerve deficit.      Sensory: No sensory deficit.      Motor: No weakness.      Coordination: Coordination normal.      Gait: Gait normal.      Deep Tendon Reflexes: Reflexes are normal and symmetric.   Psychiatric:         Mood and Affect: Mood normal.         Behavior: Behavior normal.         Thought Content: Thought content normal.         Judgment: Judgment normal.         Assessment & Plan   Problems Addressed this Visit    None     Visit Diagnoses     Adult ADHD    -  Primary    Relevant Medications    amphetamine-dextroamphetamine XR (Adderall XR) 20 MG 24 hr capsule      Diagnoses       Codes Comments    Adult ADHD    -  Primary ICD-10-CM: F90.9  ICD-9-CM: 314.01         1. Adult ADHD:         - Chronic, controlled.         - Continue on current medication.         - The patient's controlled contract is in the chart.         - Counseling was reviewed and appropriate.         - UDS is up to date.         - Refill given on Adderall XR 20 mg.         - Recheck in 3 months or sooner if any problems arise.         Transcribed from ambient dictation for Mi Belle MD by Mi Todd.  06/13/22   19:16 CDT    Patient verbalized consent to the visit recording.          This document has been electronically signed by Mi Belle MD on June 14, 2022 00:14 CDT

## 2022-06-24 ENCOUNTER — OFFICE VISIT (OUTPATIENT)
Dept: FAMILY MEDICINE CLINIC | Facility: CLINIC | Age: 27
End: 2022-06-24

## 2022-06-24 VITALS
BODY MASS INDEX: 24.21 KG/M2 | OXYGEN SATURATION: 100 % | WEIGHT: 141.8 LBS | DIASTOLIC BLOOD PRESSURE: 72 MMHG | TEMPERATURE: 97.8 F | HEART RATE: 89 BPM | HEIGHT: 64 IN | RESPIRATION RATE: 20 BRPM | SYSTOLIC BLOOD PRESSURE: 102 MMHG

## 2022-06-24 DIAGNOSIS — E55.9 VITAMIN D DEFICIENCY: ICD-10-CM

## 2022-06-24 DIAGNOSIS — F41.9 ANXIETY AND DEPRESSION: Primary | ICD-10-CM

## 2022-06-24 DIAGNOSIS — F32.A ANXIETY AND DEPRESSION: Primary | ICD-10-CM

## 2022-06-24 DIAGNOSIS — Z00.00 ANNUAL PHYSICAL EXAM: ICD-10-CM

## 2022-06-24 DIAGNOSIS — J30.2 CHRONIC SEASONAL ALLERGIC RHINITIS: ICD-10-CM

## 2022-06-24 DIAGNOSIS — Z11.59 NEED FOR HEPATITIS C SCREENING TEST: ICD-10-CM

## 2022-06-24 PROCEDURE — 99395 PREV VISIT EST AGE 18-39: CPT | Performed by: NURSE PRACTITIONER

## 2022-06-24 NOTE — PROGRESS NOTES
"Chief Complaint  Annual Exam (Pt presents for annual wellness)    Subjective        Mi Whitley presents to [facility]  History of Present Illness     Ms. Whitley presents today for her annual physical. She states that she is still doing well on her ADHD medication and that her anxiety is still well controlled. She has also started taking Prozac and reports doing well with it. Zyrtec is used for allergies and the patient confirms this is working for her along with the use of Astelin for chronic allergies that she uses on an as needed basis. She states that she is supposed to use Astelin daily but admits that she regularly forgets. The patient also notes that she is supposed to have her tonsils removed due to chronic tonsillitis but is attempting to manage her symptoms with her allergy medications to avoid surgical intervention at this time. She is currently on birth control and reports her last pap smear to be in 12/2021 with no abnormalities. The patient also states that she was informed by her pharmacist that she may be due for a booster vaccine but can not recall the specific booster that was mentioned. She also reports having a transvaginal ultrasound performed due to complaints of pain with intercourse and was advised she had a uterine fibroid that was non-cancerous and nothing of concern.       Objective   Vital Signs:  /72 (BP Location: Right arm, Patient Position: Sitting, Cuff Size: Adult)   Pulse 89   Temp 97.8 °F (36.6 °C) (Temporal)   Resp 20   Ht 161.3 cm (63.5\")   Wt 64.3 kg (141 lb 12.8 oz)   SpO2 100%   BMI 24.72 kg/m²   Estimated body mass index is 24.72 kg/m² as calculated from the following:    Height as of this encounter: 161.3 cm (63.5\").    Weight as of this encounter: 64.3 kg (141 lb 12.8 oz).    BMI is within normal parameters. No other follow-up for BMI required.      Physical Exam  Vitals and nursing note reviewed.   Constitutional:       " General: She is not in acute distress.     Appearance: She is well-developed.   HENT:      Right Ear: Tympanic membrane and ear canal normal.      Left Ear: Tympanic membrane and ear canal normal.      Nose: Nose normal.      Right Sinus: No maxillary sinus tenderness or frontal sinus tenderness.      Left Sinus: No maxillary sinus tenderness or frontal sinus tenderness.      Mouth/Throat:      Mouth: Mucous membranes are moist.      Pharynx: Oropharynx is clear. Uvula midline. No uvula swelling.   Eyes:      Conjunctiva/sclera: Conjunctivae normal.   Neck:      Thyroid: No thyromegaly.      Trachea: No tracheal deviation.   Cardiovascular:      Rate and Rhythm: Normal rate and regular rhythm.      Heart sounds: Normal heart sounds.   Pulmonary:      Effort: Pulmonary effort is normal.      Breath sounds: Normal breath sounds.   Abdominal:      General: Bowel sounds are normal.      Palpations: Abdomen is soft. There is no mass.      Tenderness: There is no abdominal tenderness.   Musculoskeletal:      Cervical back: Neck supple.   Lymphadenopathy:      Cervical: No cervical adenopathy.   Skin:     General: Skin is warm and dry.   Neurological:      Mental Status: She is alert.   Psychiatric:         Mood and Affect: Mood normal.         Behavior: Behavior normal.        Result Review :                Assessment and Plan   Diagnoses and all orders for this visit:    1. Anxiety and depression (Primary)  -     Comprehensive metabolic panel  -     CBC & Differential    2. Vitamin D deficiency  -     Vitamin D 25 hydroxy    3. Annual physical exam  -     Lipid panel    4. Need for hepatitis C screening test  -     Hepatitis C antibody    5. Chronic seasonal allergic rhinitis      Counseling/Anticipatory Guidance: encouraged healthy nutrition, family planning/contraception, physical activity, healthy weight, injury prevention, misuse of tobacco, alcohol and drugs, sexual behavior and STDs, dental health, mental health,  immunizations, screenings  Continue regular pap smears  Continue monthly sbe  Will let me know about immunizations           Follow Up   Return for Annual physical.  Patient was given instructions and counseling regarding her condition or for health maintenance advice. Please see specific information pulled into the AVS if appropriate.     She will follow up in 1 year.    Transcribed from ambient dictation for JEAN Stephenson by CHAGO PATEL.  06/24/22   18:12 CDT    Patient verbalized consent to the visit recording.

## 2022-06-25 LAB
25(OH)D3+25(OH)D2 SERPL-MCNC: 29.2 NG/ML (ref 30–100)
ALBUMIN SERPL-MCNC: 4.3 G/DL (ref 3.5–5.2)
ALBUMIN/GLOB SERPL: 1.6 G/DL
ALP SERPL-CCNC: 72 U/L (ref 39–117)
ALT SERPL-CCNC: 10 U/L (ref 1–33)
AST SERPL-CCNC: 14 U/L (ref 1–32)
BASOPHILS # BLD AUTO: 0.06 10*3/MM3 (ref 0–0.2)
BASOPHILS NFR BLD AUTO: 0.6 % (ref 0–1.5)
BILIRUB SERPL-MCNC: <0.2 MG/DL (ref 0–1.2)
BUN SERPL-MCNC: 11 MG/DL (ref 6–20)
BUN/CREAT SERPL: 14.1 (ref 7–25)
CALCIUM SERPL-MCNC: 9 MG/DL (ref 8.6–10.5)
CHLORIDE SERPL-SCNC: 102 MMOL/L (ref 98–107)
CHOLEST SERPL-MCNC: 151 MG/DL (ref 0–200)
CO2 SERPL-SCNC: 26.2 MMOL/L (ref 22–29)
CREAT SERPL-MCNC: 0.78 MG/DL (ref 0.57–1)
EGFRCR SERPLBLD CKD-EPI 2021: 108.3 ML/MIN/1.73
EOSINOPHIL # BLD AUTO: 0.13 10*3/MM3 (ref 0–0.4)
EOSINOPHIL NFR BLD AUTO: 1.3 % (ref 0.3–6.2)
ERYTHROCYTE [DISTWIDTH] IN BLOOD BY AUTOMATED COUNT: 11.5 % (ref 12.3–15.4)
GLOBULIN SER CALC-MCNC: 2.7 GM/DL
GLUCOSE SERPL-MCNC: 77 MG/DL (ref 65–99)
HCT VFR BLD AUTO: 38.1 % (ref 34–46.6)
HCV AB S/CO SERPL IA: <0.1 S/CO RATIO (ref 0–0.9)
HDLC SERPL-MCNC: 54 MG/DL (ref 40–60)
HGB BLD-MCNC: 12.8 G/DL (ref 12–15.9)
IMM GRANULOCYTES # BLD AUTO: 0.03 10*3/MM3 (ref 0–0.05)
IMM GRANULOCYTES NFR BLD AUTO: 0.3 % (ref 0–0.5)
LDLC SERPL CALC-MCNC: 83 MG/DL (ref 0–100)
LYMPHOCYTES # BLD AUTO: 1.87 10*3/MM3 (ref 0.7–3.1)
LYMPHOCYTES NFR BLD AUTO: 19.3 % (ref 19.6–45.3)
MCH RBC QN AUTO: 30.7 PG (ref 26.6–33)
MCHC RBC AUTO-ENTMCNC: 33.6 G/DL (ref 31.5–35.7)
MCV RBC AUTO: 91.4 FL (ref 79–97)
MONOCYTES # BLD AUTO: 0.73 10*3/MM3 (ref 0.1–0.9)
MONOCYTES NFR BLD AUTO: 7.5 % (ref 5–12)
NEUTROPHILS # BLD AUTO: 6.87 10*3/MM3 (ref 1.7–7)
NEUTROPHILS NFR BLD AUTO: 71 % (ref 42.7–76)
NRBC BLD AUTO-RTO: 0 /100 WBC (ref 0–0.2)
PLATELET # BLD AUTO: 251 10*3/MM3 (ref 140–450)
POTASSIUM SERPL-SCNC: 3.9 MMOL/L (ref 3.5–5.2)
PROT SERPL-MCNC: 7 G/DL (ref 6–8.5)
RBC # BLD AUTO: 4.17 10*6/MM3 (ref 3.77–5.28)
SODIUM SERPL-SCNC: 140 MMOL/L (ref 136–145)
TRIGL SERPL-MCNC: 73 MG/DL (ref 0–150)
VLDLC SERPL CALC-MCNC: 14 MG/DL (ref 5–40)
WBC # BLD AUTO: 9.69 10*3/MM3 (ref 3.4–10.8)

## 2022-07-12 DIAGNOSIS — F90.9 ADULT ADHD: ICD-10-CM

## 2022-07-12 RX ORDER — DEXTROAMPHETAMINE SACCHARATE, AMPHETAMINE ASPARTATE MONOHYDRATE, DEXTROAMPHETAMINE SULFATE AND AMPHETAMINE SULFATE 5; 5; 5; 5 MG/1; MG/1; MG/1; MG/1
20 CAPSULE, EXTENDED RELEASE ORAL EVERY MORNING
Qty: 30 CAPSULE | Refills: 0 | Status: SHIPPED | OUTPATIENT
Start: 2022-07-12 | End: 2022-08-08 | Stop reason: SDUPTHER

## 2022-07-12 NOTE — TELEPHONE ENCOUNTER
Rx Refill Note  Requested Prescriptions     Pending Prescriptions Disp Refills   • amphetamine-dextroamphetamine XR (Adderall XR) 20 MG 24 hr capsule 30 capsule 0     Sig: Take 1 capsule by mouth Every Morning Needs to be Teva brand per pt      Last office visit with prescribing clinician: 6/13/2022      Next office visit with prescribing clinician: 9/9/2022            Dorota Barajas MA  07/12/22, 12:09 CDT

## 2022-08-08 DIAGNOSIS — F90.9 ADULT ADHD: ICD-10-CM

## 2022-08-09 RX ORDER — DEXTROAMPHETAMINE SACCHARATE, AMPHETAMINE ASPARTATE MONOHYDRATE, DEXTROAMPHETAMINE SULFATE AND AMPHETAMINE SULFATE 5; 5; 5; 5 MG/1; MG/1; MG/1; MG/1
20 CAPSULE, EXTENDED RELEASE ORAL EVERY MORNING
Qty: 30 CAPSULE | Refills: 0 | Status: SHIPPED | OUTPATIENT
Start: 2022-08-09 | End: 2022-09-09 | Stop reason: SDUPTHER

## 2022-08-09 NOTE — TELEPHONE ENCOUNTER
Rx Refill Note  Requested Prescriptions     Pending Prescriptions Disp Refills   • amphetamine-dextroamphetamine XR (Adderall XR) 20 MG 24 hr capsule 30 capsule 0     Sig: Take 1 capsule by mouth Every Morning Needs to be Teva brand per pt      Last office visit with prescribing clinician: 6/13/2022      Next office visit with prescribing clinician: 9/9/2022            Dorota Barajas MA  08/09/22, 10:01 CDT

## 2022-08-23 DIAGNOSIS — F41.9 ANXIETY AND DEPRESSION: ICD-10-CM

## 2022-08-23 DIAGNOSIS — F32.A ANXIETY AND DEPRESSION: ICD-10-CM

## 2022-08-24 RX ORDER — FLUOXETINE HYDROCHLORIDE 20 MG/1
20 CAPSULE ORAL DAILY
Qty: 90 CAPSULE | Refills: 1 | Status: SHIPPED | OUTPATIENT
Start: 2022-08-24 | End: 2023-02-22 | Stop reason: SDUPTHER

## 2022-08-24 NOTE — TELEPHONE ENCOUNTER
Rx Refill Note  Requested Prescriptions     Pending Prescriptions Disp Refills   • FLUoxetine (PROzac) 20 MG capsule 90 capsule 1     Sig: Take 1 capsule by mouth Daily.      Last office visit with prescribing clinician: 6/24/2022      Next office visit with prescribing clinician: 7/3/2023            Dorota Barajas MA  08/24/22, 08:27 CDT

## 2022-09-09 ENCOUNTER — OFFICE VISIT (OUTPATIENT)
Dept: FAMILY MEDICINE CLINIC | Facility: CLINIC | Age: 27
End: 2022-09-09

## 2022-09-09 VITALS
WEIGHT: 144 LBS | DIASTOLIC BLOOD PRESSURE: 68 MMHG | TEMPERATURE: 98.7 F | BODY MASS INDEX: 24.59 KG/M2 | HEIGHT: 64 IN | RESPIRATION RATE: 20 BRPM | HEART RATE: 78 BPM | OXYGEN SATURATION: 99 % | SYSTOLIC BLOOD PRESSURE: 110 MMHG

## 2022-09-09 DIAGNOSIS — F90.9 ADULT ADHD: Primary | ICD-10-CM

## 2022-09-09 DIAGNOSIS — Z23 NEED FOR VACCINATION: ICD-10-CM

## 2022-09-09 PROCEDURE — 90715 TDAP VACCINE 7 YRS/> IM: CPT | Performed by: FAMILY MEDICINE

## 2022-09-09 PROCEDURE — 90471 IMMUNIZATION ADMIN: CPT | Performed by: FAMILY MEDICINE

## 2022-09-09 PROCEDURE — 99213 OFFICE O/P EST LOW 20 MIN: CPT | Performed by: FAMILY MEDICINE

## 2022-09-09 RX ORDER — DIPHENOXYLATE HYDROCHLORIDE AND ATROPINE SULFATE 2.5; .025 MG/1; MG/1
1 TABLET ORAL DAILY
COMMUNITY

## 2022-09-09 RX ORDER — DEXTROAMPHETAMINE SACCHARATE, AMPHETAMINE ASPARTATE MONOHYDRATE, DEXTROAMPHETAMINE SULFATE AND AMPHETAMINE SULFATE 5; 5; 5; 5 MG/1; MG/1; MG/1; MG/1
20 CAPSULE, EXTENDED RELEASE ORAL EVERY MORNING
Qty: 30 CAPSULE | Refills: 0 | Status: SHIPPED | OUTPATIENT
Start: 2022-09-09 | End: 2022-10-07 | Stop reason: SDUPTHER

## 2022-09-11 NOTE — PROGRESS NOTES
"Subjective cc: ADHD   Mi Caban is a 27 y.o. female who presents for follow up on ADHD.  She reports medication is working well, no side effects, no new concerns      History of Present Illness     The following portions of the patient's history were reviewed and updated as appropriate: allergies, current medications, past family history, past medical history, past social history, past surgical history and problem list.        Review of Systems    Objective   Blood pressure 110/68, pulse 78, temperature 98.7 °F (37.1 °C), temperature source Temporal, resp. rate 20, height 161.3 cm (63.5\"), weight 65.3 kg (144 lb), SpO2 99 %, not currently breastfeeding.  Physical Exam  Vitals and nursing note reviewed.   Constitutional:       General: She is not in acute distress.     Appearance: She is well-developed. She is not diaphoretic.   HENT:      Head: Normocephalic and atraumatic.      Right Ear: External ear normal.      Left Ear: External ear normal.      Nose: Nose normal.   Eyes:      General:         Right eye: No discharge.         Left eye: No discharge.      Conjunctiva/sclera: Conjunctivae normal.   Neck:      Thyroid: No thyromegaly.      Trachea: No tracheal deviation.   Cardiovascular:      Rate and Rhythm: Normal rate and regular rhythm.      Heart sounds: Normal heart sounds.   Pulmonary:      Effort: Pulmonary effort is normal. No respiratory distress.      Breath sounds: Normal breath sounds. No stridor. No wheezing.   Chest:      Chest wall: No tenderness.   Abdominal:      General: There is no distension.      Palpations: Abdomen is soft.      Tenderness: There is no abdominal tenderness.   Musculoskeletal:         General: Normal range of motion.      Cervical back: Normal range of motion.   Lymphadenopathy:      Cervical: No cervical adenopathy.   Skin:     General: Skin is warm and dry.   Neurological:      Mental Status: She is alert and oriented to person, place, and time.      Motor: No " abnormal muscle tone.      Coordination: Coordination normal.   Psychiatric:         Behavior: Behavior normal.         Thought Content: Thought content normal.         Judgment: Judgment normal.         Assessment & Plan   Problems Addressed this Visit    None     Visit Diagnoses     Adult ADHD    -  Primary    Relevant Medications    amphetamine-dextroamphetamine XR (Adderall XR) 20 MG 24 hr capsule    Need for vaccination        Relevant Orders    Tdap Vaccine Greater Than or Equal To 6yo IM (Completed)      Diagnoses       Codes Comments    Adult ADHD    -  Primary ICD-10-CM: F90.9  ICD-9-CM: 314.01     Need for vaccination     ICD-10-CM: Z23  ICD-9-CM: V05.9                  Answers for HPI/ROS submitted by the patient on 9/9/2022  Please describe your symptoms.: Adderall refill  Have you had these symptoms before?: No  How long have you been having these symptoms?: 1-4 days  What is the primary reason for your visit?: Other    PLAN:   #1 ADHD: chronic, controlled, continue on current medication, controlled contract in chart, candido ALBRIGHT reviewed and appropriate, refill given            This document has been electronically signed by Mi Belle MD on September 21, 2022 17:08 CDT

## 2022-10-07 DIAGNOSIS — F90.9 ADULT ADHD: ICD-10-CM

## 2022-10-10 RX ORDER — DEXTROAMPHETAMINE SACCHARATE, AMPHETAMINE ASPARTATE MONOHYDRATE, DEXTROAMPHETAMINE SULFATE AND AMPHETAMINE SULFATE 5; 5; 5; 5 MG/1; MG/1; MG/1; MG/1
20 CAPSULE, EXTENDED RELEASE ORAL EVERY MORNING
Qty: 30 CAPSULE | Refills: 0 | Status: SHIPPED | OUTPATIENT
Start: 2022-10-10 | End: 2022-11-10 | Stop reason: SDUPTHER

## 2022-10-10 NOTE — TELEPHONE ENCOUNTER
Rx Refill Note  Requested Prescriptions     Pending Prescriptions Disp Refills   • amphetamine-dextroamphetamine XR (Adderall XR) 20 MG 24 hr capsule 30 capsule 0     Sig: Take 1 capsule by mouth Every Morning Needs to be Teva brand per pt      Last office visit with prescribing clinician: 9/9/2022      Next office visit with prescribing clinician: 12/9/2022    Last refill: 9/9/2022       Anita Arizmendi MA  10/10/22, 11:50 CDT

## 2022-11-10 DIAGNOSIS — F90.9 ADULT ADHD: ICD-10-CM

## 2022-11-11 RX ORDER — DEXTROAMPHETAMINE SACCHARATE, AMPHETAMINE ASPARTATE MONOHYDRATE, DEXTROAMPHETAMINE SULFATE AND AMPHETAMINE SULFATE 5; 5; 5; 5 MG/1; MG/1; MG/1; MG/1
20 CAPSULE, EXTENDED RELEASE ORAL EVERY MORNING
Qty: 30 CAPSULE | Refills: 0 | Status: SHIPPED | OUTPATIENT
Start: 2022-11-11 | End: 2022-12-13 | Stop reason: SDUPTHER

## 2022-11-11 NOTE — TELEPHONE ENCOUNTER
Contact 11/16/21  UDS 11/16/21  LRX 10/10/22    Rx Refill Note  Requested Prescriptions     Pending Prescriptions Disp Refills   • amphetamine-dextroamphetamine XR (Adderall XR) 20 MG 24 hr capsule 30 capsule 0     Sig: Take 1 capsule by mouth Every Morning Needs to be Teva brand per pt      Last office visit with prescribing clinician: 9/9/22  Next office visit with prescribing clinician: 12/9/22           Karolina Diego MA  11/11/22, 08:20 CST

## 2022-11-16 ENCOUNTER — OFFICE VISIT (OUTPATIENT)
Dept: FAMILY MEDICINE CLINIC | Facility: CLINIC | Age: 27
End: 2022-11-16

## 2022-11-16 VITALS
OXYGEN SATURATION: 99 % | SYSTOLIC BLOOD PRESSURE: 118 MMHG | RESPIRATION RATE: 20 BRPM | BODY MASS INDEX: 24.8 KG/M2 | WEIGHT: 140 LBS | HEIGHT: 63 IN | DIASTOLIC BLOOD PRESSURE: 72 MMHG | HEART RATE: 88 BPM | TEMPERATURE: 98.7 F

## 2022-11-16 DIAGNOSIS — J02.0 STREP PHARYNGITIS: Primary | ICD-10-CM

## 2022-11-16 DIAGNOSIS — R50.9 FEVER, UNSPECIFIED FEVER CAUSE: ICD-10-CM

## 2022-11-16 LAB
EXPIRATION DATE: NORMAL
EXPIRATION DATE: NORMAL
FLUAV AG UPPER RESP QL IA.RAPID: NOT DETECTED
FLUBV AG UPPER RESP QL IA.RAPID: NOT DETECTED
INTERNAL CONTROL: NORMAL
INTERNAL CONTROL: NORMAL
Lab: NORMAL
Lab: NORMAL
S PYO AG THROAT QL: NEGATIVE
SARS-COV-2 AG UPPER RESP QL IA.RAPID: NOT DETECTED

## 2022-11-16 PROCEDURE — 99213 OFFICE O/P EST LOW 20 MIN: CPT | Performed by: NURSE PRACTITIONER

## 2022-11-16 PROCEDURE — 87428 SARSCOV & INF VIR A&B AG IA: CPT | Performed by: NURSE PRACTITIONER

## 2022-11-16 PROCEDURE — 87880 STREP A ASSAY W/OPTIC: CPT | Performed by: NURSE PRACTITIONER

## 2022-11-16 RX ORDER — DEXTROMETHORPHAN HYDROBROMIDE AND PROMETHAZINE HYDROCHLORIDE 15; 6.25 MG/5ML; MG/5ML
5 SYRUP ORAL 4 TIMES DAILY PRN
Qty: 118 ML | Refills: 0 | Status: SHIPPED | OUTPATIENT
Start: 2022-11-16 | End: 2022-11-30

## 2022-11-16 RX ORDER — AMOXICILLIN 500 MG/1
500 CAPSULE ORAL 2 TIMES DAILY
Qty: 20 CAPSULE | Refills: 0 | Status: SHIPPED | OUTPATIENT
Start: 2022-11-16 | End: 2022-11-26

## 2022-11-16 NOTE — PROGRESS NOTES
"Chief Complaint  Sore Throat (Pt presents with sore throat, left ear pain, chills, fever since yesterday )    Subjective        Mi Caban presents to [facility]  History of Present Illness  Here for acute visit  Reports headache, chills, sweats, fatigue, left ear pain, sore glands, body aches, sore throat   Has tried otc medication  Symptoms started yesterday    Objective   Vital Signs:  /72 (BP Location: Right arm, Patient Position: Sitting, Cuff Size: Adult)   Pulse 88   Temp 98.7 °F (37.1 °C) (Temporal)   Resp 20   Ht 160 cm (63\")   Wt 63.5 kg (140 lb)   SpO2 99%   BMI 24.80 kg/m²   Estimated body mass index is 24.8 kg/m² as calculated from the following:    Height as of this encounter: 160 cm (63\").    Weight as of this encounter: 63.5 kg (140 lb).    BMI is within normal parameters. No other follow-up for BMI required.      Physical Exam  Vitals and nursing note reviewed.   Constitutional:       General: She is not in acute distress.     Appearance: She is well-developed.   HENT:      Right Ear: Tympanic membrane and ear canal normal.      Left Ear: Tympanic membrane and ear canal normal.      Nose: Congestion present.      Right Sinus: No maxillary sinus tenderness or frontal sinus tenderness.      Left Sinus: No maxillary sinus tenderness or frontal sinus tenderness.      Mouth/Throat:      Mouth: Mucous membranes are moist.      Pharynx: Uvula midline. Pharyngeal swelling, oropharyngeal exudate (white) and posterior oropharyngeal erythema present. No uvula swelling.      Comments: Soft palate petechaie noted  Eyes:      Conjunctiva/sclera: Conjunctivae normal.   Neck:      Thyroid: No thyromegaly.      Trachea: No tracheal deviation.   Cardiovascular:      Rate and Rhythm: Normal rate and regular rhythm.      Heart sounds: Normal heart sounds.   Pulmonary:      Effort: Pulmonary effort is normal.      Breath sounds: Normal breath sounds.   Musculoskeletal: "      Cervical back: Neck supple.   Lymphadenopathy:      Cervical: Cervical adenopathy present.      Right cervical: Superficial cervical adenopathy present.      Left cervical: Superficial cervical adenopathy present.   Skin:     General: Skin is warm and dry.   Neurological:      Mental Status: She is alert.   Psychiatric:         Behavior: Behavior normal.        Result Review :                Assessment and Plan   Diagnoses and all orders for this visit:    1. Strep pharyngitis (Primary)    2. Fever, unspecified fever cause  -     POCT SARS-CoV-2 Antigen CAROLANN + Flu  -     POCT rapid strep A    Other orders  -     amoxicillin (AMOXIL) 500 MG capsule; Take 1 capsule by mouth 2 (Two) Times a Day for 10 days.  Dispense: 20 capsule; Refill: 0  -     Discontinue: promethazine-dextromethorphan (PROMETHAZINE-DM) 6.25-15 MG/5ML syrup; Take 5 mL by mouth 4 (Four) Times a Day As Needed for Cough.  Dispense: 118 mL; Refill: 0             Follow Up   Return in about 1 week (around 11/23/2022), or if symptoms worsen or fail to improve.  Patient was given instructions and counseling regarding her condition or for health maintenance advice. Please see specific information pulled into the AVS if appropriate.

## 2022-11-30 ENCOUNTER — OFFICE VISIT (OUTPATIENT)
Dept: FAMILY MEDICINE CLINIC | Facility: CLINIC | Age: 27
End: 2022-11-30

## 2022-11-30 VITALS
WEIGHT: 148 LBS | DIASTOLIC BLOOD PRESSURE: 68 MMHG | HEART RATE: 77 BPM | HEIGHT: 63 IN | SYSTOLIC BLOOD PRESSURE: 102 MMHG | TEMPERATURE: 99.6 F | RESPIRATION RATE: 20 BRPM | BODY MASS INDEX: 26.22 KG/M2 | OXYGEN SATURATION: 100 %

## 2022-11-30 DIAGNOSIS — J02.9 ACUTE PHARYNGITIS, UNSPECIFIED ETIOLOGY: Primary | ICD-10-CM

## 2022-11-30 DIAGNOSIS — R50.9 FEVER, UNSPECIFIED FEVER CAUSE: ICD-10-CM

## 2022-11-30 DIAGNOSIS — J02.9 SORE THROAT: ICD-10-CM

## 2022-11-30 PROCEDURE — 99214 OFFICE O/P EST MOD 30 MIN: CPT | Performed by: NURSE PRACTITIONER

## 2022-11-30 PROCEDURE — 87880 STREP A ASSAY W/OPTIC: CPT | Performed by: NURSE PRACTITIONER

## 2022-11-30 PROCEDURE — 96372 THER/PROPH/DIAG INJ SC/IM: CPT | Performed by: NURSE PRACTITIONER

## 2022-11-30 PROCEDURE — 87428 SARSCOV & INF VIR A&B AG IA: CPT | Performed by: NURSE PRACTITIONER

## 2022-11-30 RX ORDER — AMOXICILLIN AND CLAVULANATE POTASSIUM 875; 125 MG/1; MG/1
1 TABLET, FILM COATED ORAL 2 TIMES DAILY
Qty: 20 TABLET | Refills: 0 | Status: SHIPPED | OUTPATIENT
Start: 2022-11-30 | End: 2022-12-10

## 2022-11-30 RX ORDER — DEXAMETHASONE SODIUM PHOSPHATE 10 MG/ML
10 INJECTION INTRAMUSCULAR; INTRAVENOUS ONCE
Status: COMPLETED | OUTPATIENT
Start: 2022-11-30 | End: 2022-11-30

## 2022-11-30 RX ORDER — METHYLPREDNISOLONE 4 MG/1
TABLET ORAL
Qty: 21 TABLET | Refills: 0 | Status: SHIPPED | OUTPATIENT
Start: 2022-11-30 | End: 2022-12-13

## 2022-11-30 RX ADMIN — DEXAMETHASONE SODIUM PHOSPHATE 10 MG: 10 INJECTION INTRAMUSCULAR; INTRAVENOUS at 14:48

## 2022-12-01 LAB
EBV NA IGG SER IA-ACNC: <18 U/ML (ref 0–17.9)
EBV VCA IGG SER IA-ACNC: <18 U/ML (ref 0–17.9)
EBV VCA IGM SER IA-ACNC: <36 U/ML (ref 0–35.9)
SERVICE CMNT-IMP: NORMAL

## 2022-12-03 LAB — S PYO THROAT QL CULT: NEGATIVE

## 2022-12-13 ENCOUNTER — OFFICE VISIT (OUTPATIENT)
Dept: FAMILY MEDICINE CLINIC | Facility: CLINIC | Age: 27
End: 2022-12-13

## 2022-12-13 VITALS
RESPIRATION RATE: 16 BRPM | OXYGEN SATURATION: 98 % | HEART RATE: 78 BPM | SYSTOLIC BLOOD PRESSURE: 108 MMHG | BODY MASS INDEX: 25.1 KG/M2 | TEMPERATURE: 97 F | HEIGHT: 64 IN | DIASTOLIC BLOOD PRESSURE: 71 MMHG | WEIGHT: 147 LBS

## 2022-12-13 DIAGNOSIS — F90.9 ADULT ADHD: ICD-10-CM

## 2022-12-13 PROCEDURE — 99213 OFFICE O/P EST LOW 20 MIN: CPT | Performed by: FAMILY MEDICINE

## 2022-12-13 RX ORDER — DEXTROAMPHETAMINE SACCHARATE, AMPHETAMINE ASPARTATE MONOHYDRATE, DEXTROAMPHETAMINE SULFATE AND AMPHETAMINE SULFATE 5; 5; 5; 5 MG/1; MG/1; MG/1; MG/1
20 CAPSULE, EXTENDED RELEASE ORAL EVERY MORNING
Qty: 30 CAPSULE | Refills: 0 | Status: SHIPPED | OUTPATIENT
Start: 2022-12-13 | End: 2022-12-15 | Stop reason: SDUPTHER

## 2022-12-13 NOTE — PROGRESS NOTES
"Subjective cc: ADHD   Mi Caban is a 27 y.o. female.     History of Present Illness     The patient presents today for a follow-up on her ADHD.     She reports her ADHD has improved and she is doing good. The patient is currently taking Adderall XR 20 mg once a day. She denies any side effects from the medication. She is sleeping well and eating well. She states she would like to stay on her current medication regimen. She has noticed with the antacid she takes that if she waits an hour to 2 hours to take the Adderall, the Adderall still works well, but sometimes she can wait an hour to 2 hours and then it almost cancels out. She is taking Pepcid twice a day. She has tried to skip the morning dose and take one dose at night, but that is not enough. She states she gets a rash and that is why she was put on the Pepcid.      The following portions of the patient's history were reviewed and updated as appropriate: allergies, current medications, past family history, past medical history, past social history, past surgical history and problem list.        Review of Systems    Objective   Blood pressure 108/71, pulse 78, temperature 97 °F (36.1 °C), temperature source Infrared, resp. rate 16, height 161.3 cm (63.5\"), weight 66.7 kg (147 lb), SpO2 98 %, not currently breastfeeding.  Physical Exam  Vitals and nursing note reviewed.   Constitutional:       General: She is not in acute distress.     Appearance: She is well-developed. She is not diaphoretic.   HENT:      Head: Normocephalic and atraumatic.      Right Ear: External ear normal.      Left Ear: External ear normal.      Nose: Nose normal.   Eyes:      General:         Right eye: No discharge.         Left eye: No discharge.      Conjunctiva/sclera: Conjunctivae normal.   Neck:      Thyroid: No thyromegaly.      Trachea: No tracheal deviation.   Cardiovascular:      Rate and Rhythm: Normal rate and regular rhythm.      Pulses: Normal pulses.      Heart " sounds: Normal heart sounds.   Pulmonary:      Effort: Pulmonary effort is normal. No respiratory distress.      Breath sounds: Normal breath sounds. No stridor. No wheezing.   Chest:      Chest wall: No tenderness.   Musculoskeletal:         General: Normal range of motion.      Cervical back: Normal range of motion.   Lymphadenopathy:      Cervical: No cervical adenopathy.   Skin:     General: Skin is warm and dry.   Neurological:      Mental Status: She is alert and oriented to person, place, and time.      Motor: No abnormal muscle tone.      Coordination: Coordination normal.   Psychiatric:         Behavior: Behavior normal.         Thought Content: Thought content normal.         Judgment: Judgment normal.         Assessment & Plan   Problems Addressed this Visit    None  Visit Diagnoses     Adult ADHD        Relevant Medications    amphetamine-dextroamphetamine XR (Adderall XR) 20 MG 24 hr capsule    Other Relevant Orders    ToxASSURE Select 13 (MW) - Urine, Clean Catch      Diagnoses       Codes Comments    Adult ADHD     ICD-10-CM: F90.9  ICD-9-CM: 314.01         1. ADHD: Chronic, controlled.  - Continue on current medication. No side effects reported to medication. Seems to be working well.  - BRIDGET reviewed and appropriate.  - UDS to be obtained today. Controlled contract in the chart.  - Return in 3 months unless sooner with any problems.    Transcribed from ambient dictation for Mi Belle MD by Fely Payne.  12/13/22   09:29 CST    Patient or patient representative verbalized consent to the visit recording.  I have personally performed the services described in this document as transcribed by the above individual, and it is both accurate and complete.          This document has been electronically signed by Mi Belle MD on December 28, 2022 15:44 CST

## 2022-12-15 ENCOUNTER — TELEPHONE (OUTPATIENT)
Dept: FAMILY MEDICINE CLINIC | Facility: CLINIC | Age: 27
End: 2022-12-15

## 2022-12-15 DIAGNOSIS — F90.9 ADULT ADHD: ICD-10-CM

## 2022-12-15 NOTE — TELEPHONE ENCOUNTER
Caller: Mi Caban    Relationship to patient: Self    Best call back number: [phone]    Patient is needing: TO SEND ADDERALL PRESCRIPTION SENT TO [facility] [address] [place] [address], [address] [phone] [facility] [phone] FX. CURRENT PHARMACY IS OUT.

## 2022-12-15 NOTE — TELEPHONE ENCOUNTER
Rx Refill Note  Requested Prescriptions     Pending Prescriptions Disp Refills   • amphetamine-dextroamphetamine XR (Adderall XR) 20 MG 24 hr capsule 30 capsule 0     Sig: Take 1 capsule by mouth Every Morning Needs to be Teva brand per pt      Last office visit with prescribing clinician: 11/30/2022   Last telemedicine visit with prescribing clinician: 3/14/2023   Next office visit with prescribing clinician: 7/3/2023                         Would you like a call back once the refill request has been completed: [] Yes [] No    If the office needs to give you a call back, can they leave a voicemail: [] Yes [] No    Dorota Barajas MA  12/15/22, 16:24 CST

## 2022-12-16 ENCOUNTER — TELEPHONE (OUTPATIENT)
Dept: FAMILY MEDICINE CLINIC | Facility: CLINIC | Age: 27
End: 2022-12-16

## 2022-12-16 RX ORDER — DEXTROAMPHETAMINE SACCHARATE, AMPHETAMINE ASPARTATE MONOHYDRATE, DEXTROAMPHETAMINE SULFATE AND AMPHETAMINE SULFATE 5; 5; 5; 5 MG/1; MG/1; MG/1; MG/1
20 CAPSULE, EXTENDED RELEASE ORAL EVERY MORNING
Qty: 30 CAPSULE | Refills: 0 | Status: SHIPPED | OUTPATIENT
Start: 2022-12-16 | End: 2023-01-09 | Stop reason: SDUPTHER

## 2022-12-16 NOTE — TELEPHONE ENCOUNTER
Rx Refill Note  Requested Prescriptions      No prescriptions requested or ordered in this encounter      Last office visit with prescribing clinician: 11/30/2022   Last telemedicine visit with prescribing clinician: 3/14/2023   Next office visit with prescribing clinician: 7/3/2023     Anita Arizmendi MA  12/16/22, 10:08 CST

## 2022-12-16 NOTE — TELEPHONE ENCOUNTER
Caller: Mi Caban    Relationship: Self    Best call back number: [phone]    What medications are you currently taking:   Current Outpatient Medications on File Prior to Visit   Medication Sig Dispense Refill   • amphetamine-dextroamphetamine XR (Adderall XR) 20 MG 24 hr capsule Take 1 capsule by mouth Every Morning Needs to be Teva brand per pt 30 capsule 0   • cetirizine (zyrTEC) 10 MG tablet Zyrtec 10 mg tablet   Take 1 tablet every day by oral route.     • famotidine (PEPCID) 20 MG tablet Take 20 mg by mouth 2 (Two) Times a Day.     • FLUoxetine (PROzac) 20 MG capsule Take 1 capsule by mouth Daily. 90 capsule 1   • multivitamin (THERAGRAN) tablet tablet Take 1 tablet by mouth Daily.     • norethindrone (MICRONOR) 0.35 MG tablet Take 1 tablet by mouth Daily.     • nystatin-triamcinolone (MYCOLOG) 324677-6.1 UNIT/GM-% ointment Apply  topically to the appropriate area as directed 2 (Two) Times a Day As Needed.     • Probiotic Product (Probiotic-10) chewable tablet Chew 1 each Daily.       No current facility-administered medications on file prior to visit.        Which medication are you concerned about: amphetamine-dextroamphetamine XR (Adderall XR) 20 MG 24 hr capsule    What are your concerns: PATIENT REQUESTING THIS PRESCRIPTION BE SENT TO [facility] [place], [place] - [address] - [phone]  - [phone] FX  [phone] INSTEAD OF THE [facility].

## 2022-12-21 LAB — DRUGS UR: NORMAL

## 2023-01-09 DIAGNOSIS — F90.9 ADULT ADHD: ICD-10-CM

## 2023-01-09 NOTE — TELEPHONE ENCOUNTER
Not due until 01/15    Rx Refill Note  Requested Prescriptions     Pending Prescriptions Disp Refills   • amphetamine-dextroamphetamine XR (Adderall XR) 20 MG 24 hr capsule 30 capsule 0     Sig: Take 1 capsule by mouth Every Morning Needs to be Teva brand per pt      Last office visit with prescribing clinician: 12/13/2022   Last telemedicine visit with prescribing clinician: 3/14/2023   Next office visit with prescribing clinician: 3/14/2023                         Would you like a call back once the refill request has been completed: [] Yes [] No    If the office needs to give you a call back, can they leave a voicemail: [] Yes [] No    Dorota Barajas MA  01/09/23, 16:42 CST

## 2023-01-10 RX ORDER — DEXTROAMPHETAMINE SACCHARATE, AMPHETAMINE ASPARTATE MONOHYDRATE, DEXTROAMPHETAMINE SULFATE AND AMPHETAMINE SULFATE 5; 5; 5; 5 MG/1; MG/1; MG/1; MG/1
20 CAPSULE, EXTENDED RELEASE ORAL EVERY MORNING
Qty: 30 CAPSULE | Refills: 0 | Status: SHIPPED | OUTPATIENT
Start: 2023-01-10 | End: 2023-01-12 | Stop reason: SDUPTHER

## 2023-01-12 ENCOUNTER — TELEPHONE (OUTPATIENT)
Dept: FAMILY MEDICINE CLINIC | Facility: CLINIC | Age: 28
End: 2023-01-12
Payer: COMMERCIAL

## 2023-01-12 DIAGNOSIS — F90.9 ADULT ADHD: ICD-10-CM

## 2023-01-12 RX ORDER — DEXTROAMPHETAMINE SACCHARATE, AMPHETAMINE ASPARTATE MONOHYDRATE, DEXTROAMPHETAMINE SULFATE AND AMPHETAMINE SULFATE 5; 5; 5; 5 MG/1; MG/1; MG/1; MG/1
20 CAPSULE, EXTENDED RELEASE ORAL EVERY MORNING
Qty: 30 CAPSULE | Refills: 0 | Status: SHIPPED | OUTPATIENT
Start: 2023-01-12 | End: 2023-02-10 | Stop reason: SDUPTHER

## 2023-01-12 NOTE — TELEPHONE ENCOUNTER
Rx Refill Note  Requested Prescriptions     Pending Prescriptions Disp Refills   • amphetamine-dextroamphetamine XR (Adderall XR) 20 MG 24 hr capsule 30 capsule 0     Sig: Take 1 capsule by mouth Every Morning Needs to be Teva brand per pt      Last office visit with prescribing clinician: 12/13/2022   Last telemedicine visit with prescribing clinician: 3/14/2023   Next office visit with prescribing clinician: 3/14/2023                         Would you like a call back once the refill request has been completed: [] Yes [] No    If the office needs to give you a call back, can they leave a voicemail: [] Yes [] No    Cheryl Hinds LPN  01/12/23, 14:39 CST

## 2023-01-12 NOTE — TELEPHONE ENCOUNTER
Caller: Arsh Mi    Relationship: Self    Best call back number: [phone]    Requested Prescriptions:   Requested Prescriptions     Pending Prescriptions Disp Refills   • amphetamine-dextroamphetamine XR (Adderall XR) 20 MG 24 hr capsule 30 capsule 0     Sig: Take 1 capsule by mouth Every Morning Needs to be Teva brand per pt        Pharmacy where request should be sent: [facility] [address] [place] [address] [phone] [facility] [phone] FX     Additional details provided by patient: THIS SCRIPT WAS SENT TO [facility] BUT THEY ONLY HAVE THE BRAND NAME AND PATIENT CANNOT AFFORD THAT. [facility] DOES HAVE THE GENERIC BUT NOT MUCH LEFT.     Does the patient have less than a 3 day supply:  [x] Yes  [] No    Would you like a call back once the refill request has been completed: [] Yes [x] No    If the office needs to give you a call back, can they leave a voicemail: [] Yes [x] No    Madison Dillon Rep   01/12/23 14:30 CST

## 2023-02-10 DIAGNOSIS — F90.9 ADULT ADHD: ICD-10-CM

## 2023-02-10 RX ORDER — DEXTROAMPHETAMINE SACCHARATE, AMPHETAMINE ASPARTATE MONOHYDRATE, DEXTROAMPHETAMINE SULFATE AND AMPHETAMINE SULFATE 5; 5; 5; 5 MG/1; MG/1; MG/1; MG/1
20 CAPSULE, EXTENDED RELEASE ORAL EVERY MORNING
Qty: 30 CAPSULE | Refills: 0 | Status: SHIPPED | OUTPATIENT
Start: 2023-02-10 | End: 2023-03-14 | Stop reason: SDUPTHER

## 2023-02-22 DIAGNOSIS — F32.A ANXIETY AND DEPRESSION: ICD-10-CM

## 2023-02-22 DIAGNOSIS — F41.9 ANXIETY AND DEPRESSION: ICD-10-CM

## 2023-02-22 RX ORDER — FLUOXETINE HYDROCHLORIDE 20 MG/1
20 CAPSULE ORAL DAILY
Qty: 90 CAPSULE | Refills: 1 | Status: SHIPPED | OUTPATIENT
Start: 2023-02-22

## 2023-02-22 NOTE — TELEPHONE ENCOUNTER
Rx Refill Note  Requested Prescriptions     Pending Prescriptions Disp Refills   • FLUoxetine (PROzac) 20 MG capsule 90 capsule 1     Sig: Take 1 capsule by mouth Daily.      Last office visit with prescribing clinician: 11/30/2022   Next office visit with prescribing clinician: 7/3/2023                         Would you like a call back once the refill request has been completed: [] Yes [] No    If the office needs to give you a call back, can they leave a voicemail: [] Yes [] No    Dorota Barajas MA  02/22/23, 12:07 CST

## 2023-03-14 ENCOUNTER — OFFICE VISIT (OUTPATIENT)
Dept: FAMILY MEDICINE CLINIC | Facility: CLINIC | Age: 28
End: 2023-03-14
Payer: COMMERCIAL

## 2023-03-14 VITALS
BODY MASS INDEX: 26.29 KG/M2 | OXYGEN SATURATION: 98 % | SYSTOLIC BLOOD PRESSURE: 113 MMHG | RESPIRATION RATE: 18 BRPM | HEIGHT: 64 IN | HEART RATE: 74 BPM | DIASTOLIC BLOOD PRESSURE: 72 MMHG | TEMPERATURE: 98.7 F | WEIGHT: 154 LBS

## 2023-03-14 DIAGNOSIS — F90.9 ADULT ADHD: Primary | ICD-10-CM

## 2023-03-14 DIAGNOSIS — H92.02 OTALGIA OF LEFT EAR: ICD-10-CM

## 2023-03-14 PROCEDURE — 99213 OFFICE O/P EST LOW 20 MIN: CPT | Performed by: FAMILY MEDICINE

## 2023-03-14 RX ORDER — DEXTROAMPHETAMINE SACCHARATE, AMPHETAMINE ASPARTATE MONOHYDRATE, DEXTROAMPHETAMINE SULFATE AND AMPHETAMINE SULFATE 5; 5; 5; 5 MG/1; MG/1; MG/1; MG/1
20 CAPSULE, EXTENDED RELEASE ORAL EVERY MORNING
Qty: 30 CAPSULE | Refills: 0 | Status: SHIPPED | OUTPATIENT
Start: 2023-03-14 | End: 2023-03-17 | Stop reason: SDUPTHER

## 2023-03-14 RX ORDER — AZELASTINE 1 MG/ML
2 SPRAY, METERED NASAL 2 TIMES DAILY
Qty: 30 ML | Refills: 2 | Status: SHIPPED | OUTPATIENT
Start: 2023-03-14

## 2023-03-14 NOTE — PROGRESS NOTES
"Subjective cc: ADHD   Mi Caban is a 27 y.o. female.     History of Present Illness     The patient presents today for follow up on her ADHD.    She has been taking Adderall XR 20 mg once a day, which seems to be working well for her. She denies any side effects from the medication. The patient is due for a refill.    Her anxiety and depression are well controlled with Prozac.    The patient thinks she has a sinus infection. She took a home COVID-19 test, which was negative. The patient took a Z-Jem at home, and she has been taking it. Her left ear has been hurting. The patient used a nasal spray earlier in the week, but it is .    The following portions of the patient's history were reviewed and updated as appropriate: allergies, current medications, past family history, past medical history, past social history, past surgical history and problem list.        Review of Systems    Objective   Blood pressure 113/72, pulse 74, temperature 98.7 °F (37.1 °C), temperature source Infrared, resp. rate 18, height 161.3 cm (63.5\"), weight 69.9 kg (154 lb), SpO2 98 %, not currently breastfeeding.  Physical Exam  Vitals and nursing note reviewed.   Constitutional:       General: She is not in acute distress.     Appearance: She is well-developed. She is not diaphoretic.   HENT:      Head: Normocephalic and atraumatic.      Nose: Nose normal.   Eyes:      General:         Right eye: No discharge.         Left eye: No discharge.      Conjunctiva/sclera: Conjunctivae normal.   Neck:      Thyroid: No thyromegaly.      Trachea: No tracheal deviation.   Cardiovascular:      Rate and Rhythm: Normal rate and regular rhythm.      Heart sounds: Normal heart sounds.   Pulmonary:      Effort: Pulmonary effort is normal. No respiratory distress.      Breath sounds: Normal breath sounds. No stridor. No wheezing.   Chest:      Chest wall: No tenderness.   Abdominal:      General: Bowel sounds are normal. There is no distension. "      Palpations: Abdomen is soft.      Tenderness: There is no abdominal tenderness.   Musculoskeletal:         General: Normal range of motion.      Cervical back: Normal range of motion.   Lymphadenopathy:      Cervical: No cervical adenopathy.   Skin:     General: Skin is warm and dry.   Neurological:      Mental Status: She is alert and oriented to person, place, and time.      Motor: No abnormal muscle tone.      Coordination: Coordination normal.   Psychiatric:         Behavior: Behavior normal.         Thought Content: Thought content normal.         Judgment: Judgment normal.         Assessment & Plan   Problems Addressed this Visit    None  Visit Diagnoses     Adult ADHD    -  Primary    Relevant Medications    amphetamine-dextroamphetamine XR (Adderall XR) 20 MG 24 hr capsule    Otalgia of left ear        Relevant Medications    azelastine (ASTELIN) 0.1 % nasal spray      Diagnoses       Codes Comments    Adult ADHD    -  Primary ICD-10-CM: F90.9  ICD-9-CM: 314.01     Otalgia of left ear     ICD-10-CM: H92.02  ICD-9-CM: 388.70           1. ADHD: Chronic, stable.  - Continue with current medication.  - Control contract in the chart.  - UDS up to date.  - BRIDGET reviewed and appropriate.  - Refill given on all medication.    2. Otalgia: New.  - Patient complains of left ear pain.  - Advised on use of nasal spray to help relieve any pressure within the eustachian tube.  - Patient has already started an oral antibiotic on her own.  - Advised to complete course of antibiotic and return to office if symptoms are not improving.    Transcribed from ambient dictation for Mi Belle MD by Oliver Kumar, Quality .  03/14/23   17:16 CDT    Patient or patient representative verbalized consent to the visit recording.  I have personally performed the services described in this document as transcribed by the above individual, and it is both accurate and complete.            This  document has been electronically signed by Mi Belle MD on March 14, 2023 18:21 CDT

## 2023-03-17 DIAGNOSIS — F90.9 ADULT ADHD: Primary | ICD-10-CM

## 2023-03-17 NOTE — TELEPHONE ENCOUNTER
Fax received from pharmacy states that pt was only able to fill qty 20 of 30 due to only having 20 left in stock. Reports that pt loses 10 caps and will need new rx early.

## 2023-03-20 RX ORDER — DEXTROAMPHETAMINE SACCHARATE, AMPHETAMINE ASPARTATE MONOHYDRATE, DEXTROAMPHETAMINE SULFATE AND AMPHETAMINE SULFATE 5; 5; 5; 5 MG/1; MG/1; MG/1; MG/1
20 CAPSULE, EXTENDED RELEASE ORAL EVERY MORNING
Qty: 30 CAPSULE | Refills: 0 | Status: SHIPPED | OUTPATIENT
Start: 2023-03-20 | End: 2023-04-03 | Stop reason: SDUPTHER

## 2023-04-03 DIAGNOSIS — F90.9 ADULT ADHD: ICD-10-CM

## 2023-04-05 NOTE — TELEPHONE ENCOUNTER
Rx Refill Note  Requested Prescriptions     Pending Prescriptions Disp Refills   • amphetamine-dextroamphetamine XR (Adderall XR) 20 MG 24 hr capsule 30 capsule 0     Sig: Take 1 capsule by mouth Every Morning Needs to be Teva brand per pt      Last office visit with prescribing clinician: 3/14/2023   Next office visit with prescribing clinician: 6/15/2023                         Would you like a call back once the refill request has been completed: [] Yes [] No    If the office needs to give you a call back, can they leave a voicemail: [] Yes [] No    Dorota Barajas MA  04/05/23, 14:42 CDT

## 2023-04-07 RX ORDER — DEXTROAMPHETAMINE SACCHARATE, AMPHETAMINE ASPARTATE MONOHYDRATE, DEXTROAMPHETAMINE SULFATE AND AMPHETAMINE SULFATE 5; 5; 5; 5 MG/1; MG/1; MG/1; MG/1
20 CAPSULE, EXTENDED RELEASE ORAL EVERY MORNING
Qty: 10 CAPSULE | Refills: 0 | Status: SHIPPED | OUTPATIENT
Start: 2023-04-07

## 2023-04-09 DIAGNOSIS — F90.9 ADULT ADHD: ICD-10-CM

## 2023-04-10 RX ORDER — DEXTROAMPHETAMINE SACCHARATE, AMPHETAMINE ASPARTATE MONOHYDRATE, DEXTROAMPHETAMINE SULFATE AND AMPHETAMINE SULFATE 5; 5; 5; 5 MG/1; MG/1; MG/1; MG/1
20 CAPSULE, EXTENDED RELEASE ORAL EVERY MORNING
Qty: 10 CAPSULE | Refills: 0 | OUTPATIENT
Start: 2023-04-10

## 2023-04-10 NOTE — TELEPHONE ENCOUNTER
Patient reports [facility] should be able to fill a 30 day supply for her today because they do have medication in stock.  She reports [facility] is contacting [facility] to cancel 10 day script sent due to insurance purposes.  Patient will contact us if there are any problems.

## 2023-05-07 DIAGNOSIS — F90.9 ADULT ADHD: ICD-10-CM

## 2023-05-08 RX ORDER — DEXTROAMPHETAMINE SACCHARATE, AMPHETAMINE ASPARTATE MONOHYDRATE, DEXTROAMPHETAMINE SULFATE AND AMPHETAMINE SULFATE 5; 5; 5; 5 MG/1; MG/1; MG/1; MG/1
20 CAPSULE, EXTENDED RELEASE ORAL EVERY MORNING
Qty: 30 CAPSULE | Refills: 0 | Status: SHIPPED | OUTPATIENT
Start: 2023-05-08

## 2023-05-08 NOTE — TELEPHONE ENCOUNTER
Rx Refill Note  Requested Prescriptions     Pending Prescriptions Disp Refills   • amphetamine-dextroamphetamine XR (Adderall XR) 20 MG 24 hr capsule 30 capsule 0     Sig: Take 1 capsule by mouth Every Morning Needs to be Teva brand per pt      Last office visit with prescribing clinician: 3/14/2023   Next office visit with prescribing clinician: 6/15/2023                         Would you like a call back once the refill request has been completed: [] Yes [] No    If the office needs to give you a call back, can they leave a voicemail: [] Yes [] No    Dorota Barajas MA  05/08/23, 14:53 CDT

## 2023-06-05 ENCOUNTER — OFFICE VISIT (OUTPATIENT)
Dept: FAMILY MEDICINE CLINIC | Facility: CLINIC | Age: 28
End: 2023-06-05
Payer: COMMERCIAL

## 2023-06-05 VITALS
RESPIRATION RATE: 16 BRPM | DIASTOLIC BLOOD PRESSURE: 77 MMHG | BODY MASS INDEX: 25.78 KG/M2 | HEART RATE: 80 BPM | TEMPERATURE: 98.4 F | OXYGEN SATURATION: 98 % | WEIGHT: 151 LBS | HEIGHT: 64 IN | SYSTOLIC BLOOD PRESSURE: 112 MMHG

## 2023-06-05 DIAGNOSIS — F90.9 ADULT ADHD: ICD-10-CM

## 2023-06-05 PROCEDURE — 99213 OFFICE O/P EST LOW 20 MIN: CPT | Performed by: FAMILY MEDICINE

## 2023-06-05 RX ORDER — DEXTROAMPHETAMINE SACCHARATE, AMPHETAMINE ASPARTATE MONOHYDRATE, DEXTROAMPHETAMINE SULFATE AND AMPHETAMINE SULFATE 5; 5; 5; 5 MG/1; MG/1; MG/1; MG/1
20 CAPSULE, EXTENDED RELEASE ORAL EVERY MORNING
Qty: 30 CAPSULE | Refills: 0 | Status: SHIPPED | OUTPATIENT
Start: 2023-06-05

## 2023-06-05 NOTE — PROGRESS NOTES
"Subjective cc: ADHD   Mi Caban is a 27 y.o. female who presents for follow up on ADHD.  Medication is working well no side effects - no new concerns - refill due      History of Present Illness     The following portions of the patient's history were reviewed and updated as appropriate: allergies, current medications, past family history, past medical history, past social history, past surgical history, and problem list.        Review of Systems    Objective   Blood pressure 112/77, pulse 80, temperature 98.4 °F (36.9 °C), temperature source Infrared, resp. rate 16, height 161.3 cm (63.5\"), weight 68.5 kg (151 lb), SpO2 98 %, not currently breastfeeding.  Physical Exam  Vitals and nursing note reviewed.   Constitutional:       General: She is not in acute distress.     Appearance: She is well-developed. She is not diaphoretic.   HENT:      Head: Normocephalic and atraumatic.      Right Ear: External ear normal.      Left Ear: External ear normal.      Nose: Nose normal.   Eyes:      General:         Right eye: No discharge.         Left eye: No discharge.      Conjunctiva/sclera: Conjunctivae normal.   Neck:      Thyroid: No thyromegaly.      Trachea: No tracheal deviation.   Cardiovascular:      Rate and Rhythm: Normal rate and regular rhythm.      Heart sounds: Normal heart sounds.   Pulmonary:      Effort: Pulmonary effort is normal. No respiratory distress.      Breath sounds: Normal breath sounds. No stridor. No wheezing.   Chest:      Chest wall: No tenderness.   Musculoskeletal:      Cervical back: Normal range of motion.   Skin:     General: Skin is warm and dry.   Neurological:      Mental Status: She is alert and oriented to person, place, and time.      Motor: No abnormal muscle tone.      Coordination: Coordination normal.   Psychiatric:         Behavior: Behavior normal.         Thought Content: Thought content normal.         Judgment: Judgment normal.       Assessment & Plan   Problems " Addressed this Visit    None  Visit Diagnoses       Adult ADHD        Relevant Medications    amphetamine-dextroamphetamine XR (Adderall XR) 20 MG 24 hr capsule          Diagnoses         Codes Comments    Adult ADHD     ICD-10-CM: F90.9  ICD-9-CM: 314.01           PLAN:     #1 ADHD: chronic controlled, continue on current emdicaiton, refill given, UDS candido HOWARD reviewed, cc in chart, return in 3 months           This document has been electronically signed by Mi Belle MD on June 5, 2023 08:15 CDT           Answers submitted by the patient for this visit:  Other (Submitted on 6/3/2023)  Please describe your symptoms.: Need adderall refills  Have you had these symptoms before?: No  How long have you been having these symptoms?: 1-4 days  Primary Reason for Visit (Submitted on 6/3/2023)  What is the primary reason for your visit?: Other

## 2023-08-06 DIAGNOSIS — F90.9 ADULT ADHD: ICD-10-CM

## 2023-08-07 NOTE — TELEPHONE ENCOUNTER
Rx Refill Note  Requested Prescriptions     Pending Prescriptions Disp Refills    amphetamine-dextroamphetamine XR (Adderall XR) 20 MG 24 hr capsule 30 capsule 0     Sig: Take 1 capsule by mouth Every Morning Needs to be Teva brand per pt      Last office visit with prescribing clinician: 7/5/23  Last telemedicine visit with prescribing clinician: Visit date not found   Next office visit with prescribing clinician: 9/5/23    Karolina Diego MA  08/07/23, 09:57 CDT    Contract 12/13/22  UDS 12/13/22

## 2023-08-09 RX ORDER — DEXTROAMPHETAMINE SACCHARATE, AMPHETAMINE ASPARTATE MONOHYDRATE, DEXTROAMPHETAMINE SULFATE AND AMPHETAMINE SULFATE 5; 5; 5; 5 MG/1; MG/1; MG/1; MG/1
20 CAPSULE, EXTENDED RELEASE ORAL EVERY MORNING
Qty: 30 CAPSULE | Refills: 0 | Status: SHIPPED | OUTPATIENT
Start: 2023-08-09

## 2023-08-14 DIAGNOSIS — F32.A ANXIETY AND DEPRESSION: ICD-10-CM

## 2023-08-14 DIAGNOSIS — F41.9 ANXIETY AND DEPRESSION: ICD-10-CM

## 2023-08-15 RX ORDER — FLUOXETINE HYDROCHLORIDE 20 MG/1
20 CAPSULE ORAL DAILY
Qty: 90 CAPSULE | Refills: 0 | Status: SHIPPED | OUTPATIENT
Start: 2023-08-15

## 2023-09-05 ENCOUNTER — OFFICE VISIT (OUTPATIENT)
Dept: FAMILY MEDICINE CLINIC | Facility: CLINIC | Age: 28
End: 2023-09-05
Payer: COMMERCIAL

## 2023-09-05 VITALS
TEMPERATURE: 97.8 F | WEIGHT: 145 LBS | RESPIRATION RATE: 18 BRPM | DIASTOLIC BLOOD PRESSURE: 76 MMHG | BODY MASS INDEX: 24.75 KG/M2 | HEIGHT: 64 IN | OXYGEN SATURATION: 99 % | HEART RATE: 89 BPM | SYSTOLIC BLOOD PRESSURE: 112 MMHG

## 2023-09-05 DIAGNOSIS — F90.9 ADULT ADHD: ICD-10-CM

## 2023-09-05 PROCEDURE — 99213 OFFICE O/P EST LOW 20 MIN: CPT | Performed by: FAMILY MEDICINE

## 2023-09-05 RX ORDER — DEXTROAMPHETAMINE SACCHARATE, AMPHETAMINE ASPARTATE MONOHYDRATE, DEXTROAMPHETAMINE SULFATE AND AMPHETAMINE SULFATE 6.25; 6.25; 6.25; 6.25 MG/1; MG/1; MG/1; MG/1
25 CAPSULE, EXTENDED RELEASE ORAL EVERY MORNING
Qty: 30 CAPSULE | Refills: 0 | Status: SHIPPED | OUTPATIENT
Start: 2023-09-05

## 2023-09-05 NOTE — PROGRESS NOTES
"Subjective cc: ADHD  Mi Caban is a 28 y.o. female. She presents today for ADHD follow-up.    History of Present Illness     The patient states that she has been doing well since her last visit. She has noticed that Adderall is not as effective as it wears off about midday. As soon as she gets home, she takes a nap. She is hoping to increase her dose. She has been on 20 mg for several months now. The patient states that she has been on 20 mg of Prozac for a little while now. She does not feel like she has a lot of anxiety, when she does have a flare up, she can manage it.    The patient's weight is decreasing. Her weight was 152 pounds. Today, her weight is 145 pounds. Her BMI currently is 25.3.    The following portions of the patient's history were reviewed and updated as appropriate: allergies, current medications, past family history, past medical history, past social history, past surgical history, and problem list.        Review of Systems  A review of systems was performed, and pertinent findings are noted in the HPI.    Objective   Blood pressure 112/76, pulse 89, temperature 97.8 °F (36.6 °C), temperature source Infrared, resp. rate 18, height 161.3 cm (63.5\"), weight 65.8 kg (145 lb), SpO2 99 %, not currently breastfeeding.  Physical Exam  Vitals and nursing note reviewed.   Constitutional:       General: She is not in acute distress.     Appearance: She is well-developed. She is not diaphoretic.   HENT:      Head: Normocephalic and atraumatic.      Right Ear: External ear normal.      Left Ear: External ear normal.      Nose: Nose normal.   Eyes:      General:         Right eye: No discharge.         Left eye: No discharge.      Conjunctiva/sclera: Conjunctivae normal.   Neck:      Thyroid: No thyromegaly.      Trachea: No tracheal deviation.   Cardiovascular:      Rate and Rhythm: Normal rate and regular rhythm.      Heart sounds: Normal heart sounds.   Pulmonary:      Effort: Pulmonary effort is " normal. No respiratory distress.      Breath sounds: Normal breath sounds. No stridor. No wheezing.   Chest:      Chest wall: No tenderness.   Musculoskeletal:         General: Normal range of motion.      Cervical back: Normal range of motion.   Lymphadenopathy:      Cervical: No cervical adenopathy.   Skin:     General: Skin is warm and dry.   Neurological:      Mental Status: She is alert and oriented to person, place, and time.      Motor: No abnormal muscle tone.      Coordination: Coordination normal.   Psychiatric:         Behavior: Behavior normal.         Thought Content: Thought content normal.         Judgment: Judgment normal.               Assessment & Plan   Problems Addressed this Visit    None  Visit Diagnoses       Adult ADHD        Relevant Medications    amphetamine-dextroamphetamine XR (Adderall XR) 25 MG 24 hr capsule          Diagnoses         Codes Comments    Adult ADHD     ICD-10-CM: F90.9  ICD-9-CM: 314.01           1. Adult ADHD: Chronic, controlled, but patient feels the medication is wearing off a little too quickly.   - We will increase the dose from 20 mg to 25 mg. New prescription sent.   - Control contract in [facility]  - [facility] reviewed and appropriate.   - Urine drug screen up to date.   - We will increase medicine and follow up in 3 months unless having any problems sooner.        Transcribed from ambient dictation for Mi Belle MD by Sandra Lopez.  09/05/23   08:35 CDT    Patient or patient representative verbalized consent to the visit recording.  I have personally performed the services described in this document as transcribed by the above individual, and it is both accurate and complete.          This document has been electronically signed by Mi Belle MD on September 6, 2023 16:56 CDT

## 2023-10-03 DIAGNOSIS — F90.9 ADULT ADHD: ICD-10-CM

## 2023-10-03 RX ORDER — DEXTROAMPHETAMINE SACCHARATE, AMPHETAMINE ASPARTATE MONOHYDRATE, DEXTROAMPHETAMINE SULFATE AND AMPHETAMINE SULFATE 6.25; 6.25; 6.25; 6.25 MG/1; MG/1; MG/1; MG/1
25 CAPSULE, EXTENDED RELEASE ORAL EVERY MORNING
Qty: 30 CAPSULE | Refills: 0 | Status: SHIPPED | OUTPATIENT
Start: 2023-10-03

## 2023-10-03 NOTE — TELEPHONE ENCOUNTER
Rx Refill Note  Requested Prescriptions     Pending Prescriptions Disp Refills    amphetamine-dextroamphetamine XR (Adderall XR) 25 MG 24 hr capsule 30 capsule 0     Sig: Take 1 capsule by mouth Every Morning      Last office visit with prescribing clinician: 9/5/2023   Last telemedicine visit with prescribing clinician: Visit date not found   Next office visit with prescribing clinician: 12/7/2023   Requirements are up to date  Pooja Valles MA  10/03/23, 09:43 CDT

## 2023-11-03 DIAGNOSIS — F90.9 ADULT ADHD: ICD-10-CM

## 2023-11-03 NOTE — TELEPHONE ENCOUNTER
Rx Refill Note  Requested Prescriptions     Pending Prescriptions Disp Refills    amphetamine-dextroamphetamine XR (Adderall XR) 25 MG 24 hr capsule 30 capsule 0     Sig: Take 1 capsule by mouth Every Morning      Last office visit with prescribing clinician: 9/5/2023  Next office visit with prescribing clinician: 12/7/2023    CC: 12/13/2023    UDS: 6/24/2022          Anita Arizmendi CMA  11/03/23, 11:34 CDT

## 2023-11-04 RX ORDER — DEXTROAMPHETAMINE SACCHARATE, AMPHETAMINE ASPARTATE MONOHYDRATE, DEXTROAMPHETAMINE SULFATE AND AMPHETAMINE SULFATE 6.25; 6.25; 6.25; 6.25 MG/1; MG/1; MG/1; MG/1
25 CAPSULE, EXTENDED RELEASE ORAL EVERY MORNING
Qty: 30 CAPSULE | Refills: 0 | Status: SHIPPED | OUTPATIENT
Start: 2023-11-04

## 2023-11-05 DIAGNOSIS — F41.9 ANXIETY AND DEPRESSION: ICD-10-CM

## 2023-11-05 DIAGNOSIS — F32.A ANXIETY AND DEPRESSION: ICD-10-CM

## 2023-11-06 RX ORDER — FLUOXETINE HYDROCHLORIDE 20 MG/1
20 CAPSULE ORAL DAILY
Qty: 90 CAPSULE | Refills: 0 | Status: SHIPPED | OUTPATIENT
Start: 2023-11-06

## 2023-11-06 NOTE — TELEPHONE ENCOUNTER
Rx Refill Note  Requested Prescriptions     Pending Prescriptions Disp Refills    FLUoxetine (PROzac) 20 MG capsule [Pharmacy Med Name: FLUoxetine HCl 20 MG Oral Capsule] 90 capsule 0     Sig: Take 1 capsule by mouth once daily      Last office visit with prescribing clinician: 7/5/2023   Last telemedicine visit with prescribing clinician: Visit date not found   Next office visit with prescribing clinician: Visit date not found                         Would you like a call back once the refill request has been completed: [] Yes [] No    If the office needs to give you a call back, can they leave a voicemail: [] Yes [] No    Cheryl Hinds LPN  11/06/23, 13:51 CST

## 2023-12-04 ENCOUNTER — OFFICE VISIT (OUTPATIENT)
Dept: FAMILY MEDICINE CLINIC | Facility: CLINIC | Age: 28
End: 2023-12-04
Payer: COMMERCIAL

## 2023-12-04 VITALS
SYSTOLIC BLOOD PRESSURE: 128 MMHG | BODY MASS INDEX: 26.29 KG/M2 | DIASTOLIC BLOOD PRESSURE: 84 MMHG | WEIGHT: 154 LBS | TEMPERATURE: 97.8 F | OXYGEN SATURATION: 99 % | RESPIRATION RATE: 18 BRPM | HEIGHT: 64 IN | HEART RATE: 86 BPM

## 2023-12-04 DIAGNOSIS — F90.9 ADULT ADHD: ICD-10-CM

## 2023-12-04 PROCEDURE — 99213 OFFICE O/P EST LOW 20 MIN: CPT | Performed by: FAMILY MEDICINE

## 2023-12-04 RX ORDER — DEXTROAMPHETAMINE SACCHARATE, AMPHETAMINE ASPARTATE MONOHYDRATE, DEXTROAMPHETAMINE SULFATE AND AMPHETAMINE SULFATE 6.25; 6.25; 6.25; 6.25 MG/1; MG/1; MG/1; MG/1
25 CAPSULE, EXTENDED RELEASE ORAL EVERY MORNING
Qty: 30 CAPSULE | Refills: 0 | Status: SHIPPED | OUTPATIENT
Start: 2023-12-04

## 2023-12-04 NOTE — PROGRESS NOTES
"Subjective   Mi Caban is a 28 y.o. female.     History of Present Illness     Mi Caban is a 28-year-old female who presents today for a follow-up visit.    Attention deficit hyperactivity disorder (ADHD), predominantly inattentive type  The patient reports that the medication is working well for her. She denies any concerns or complaints about the medication. She is currently taking Adderall 25 mg once a day.    TMJ  The patient reports that she does have TMJ. She states that the mouth guard alone does not help as it used to. She notes that she has been reading about Botox for that.    Health maintenance  The patient declines the influenza vaccine today.    The following portions of the patient's history were reviewed and updated as appropriate: allergies, current medications, past family history, past medical history, past social history, past surgical history, and problem list.        Review of Systems    Objective   Blood pressure 128/84, pulse 86, temperature 97.8 °F (36.6 °C), temperature source Infrared, resp. rate 18, height 161.3 cm (63.5\"), weight 69.9 kg (154 lb), SpO2 99%, not currently breastfeeding.  Physical Exam  Vitals and nursing note reviewed.   Constitutional:       General: She is not in acute distress.     Appearance: She is well-developed. She is not diaphoretic.   HENT:      Head: Normocephalic and atraumatic.      Right Ear: External ear normal.      Left Ear: External ear normal.      Nose: Nose normal.   Eyes:      General:         Right eye: No discharge.         Left eye: No discharge.      Conjunctiva/sclera: Conjunctivae normal.   Neck:      Thyroid: No thyromegaly.      Trachea: No tracheal deviation.   Cardiovascular:      Rate and Rhythm: Normal rate and regular rhythm.      Heart sounds: Normal heart sounds.   Pulmonary:      Effort: Pulmonary effort is normal. No respiratory distress.      Breath sounds: Normal breath sounds. No stridor. No wheezing.   Chest:      " Chest wall: No tenderness.   Musculoskeletal:      Cervical back: Normal range of motion.   Lymphadenopathy:      Cervical: No cervical adenopathy.   Skin:     General: Skin is warm and dry.   Neurological:      Mental Status: She is alert and oriented to person, place, and time.      Motor: No abnormal muscle tone.      Coordination: Coordination normal.   Psychiatric:         Behavior: Behavior normal.         Thought Content: Thought content normal.         Judgment: Judgment normal.                 Assessment & Plan   Problems Addressed this Visit    None  Visit Diagnoses       Adult ADHD        Relevant Medications    amphetamine-dextroamphetamine XR (Adderall XR) 25 MG 24 hr capsule          Diagnoses         Codes Comments    Adult ADHD     ICD-10-CM: F90.9  ICD-9-CM: 314.01           1. Adult ADHD  Chronic, controlled. Continue on current medication. UDS collected today. BRIDGET reviewed and appropriate. Control contracted in the chart.    2. TMJ symptoms  Patient complains of TMJ symptoms. She is interested in Botox injections to see if it would help with her TMJ. I am unsure if our ENT offer office offers this. We will check with them and let patient know.    Transcribed from ambient dictation for Mi Belle MD by Naheed Art [employer] .  12/04/23   11:31 CST    Patient or patient representative verbalized consent to the visit recording.  I have personally performed the services described in this document as transcribed by the above individual, and it is both accurate and complete.          This document has been electronically signed by Mi Belle MD on December 4, 2023 13:16 CST

## 2023-12-21 ENCOUNTER — TELEPHONE (OUTPATIENT)
Dept: FAMILY MEDICINE CLINIC | Facility: CLINIC | Age: 28
End: 2023-12-21
Payer: COMMERCIAL

## 2023-12-21 NOTE — TELEPHONE ENCOUNTER
----- Message from Mi Belle MD sent at 12/20/2023  8:16 AM CST -----  Please advise pt and see if this is something she wants to do     ----- Message -----  From: Cheryl Hinds LPN  Sent: 12/19/2023   3:38 PM CST  To: Mi Belle MD    ENT does not do botox for TMJ. They report patient will need to see oral surgery.   ----- Message -----  From: Mi Belle MD  Sent: 12/4/2023  11:03 AM CST  To: [facility]    Please check with ENT to see if they offer botox injections for TMJ

## 2024-01-01 NOTE — TELEPHONE ENCOUNTER
Rx Refill Note  Requested Prescriptions     Pending Prescriptions Disp Refills   • amphetamine-dextroamphetamine XR (Adderall XR) 20 MG 24 hr capsule 30 capsule 0     Sig: Take 1 capsule by mouth Every Morning Needs to be Teva brand per pt      Last office visit with prescribing clinician: 12/13/2022   Next office visit with prescribing clinician: 3/14/2023                         Would you like a call back once the refill request has been completed: [] Yes [] No    If the office needs to give you a call back, can they leave a voicemail: [] Yes [] No    Dorota Barajas MA  02/10/23, 08:58 CST     4

## 2024-01-02 DIAGNOSIS — F90.9 ADULT ADHD: ICD-10-CM

## 2024-01-02 RX ORDER — DEXTROAMPHETAMINE SACCHARATE, AMPHETAMINE ASPARTATE MONOHYDRATE, DEXTROAMPHETAMINE SULFATE AND AMPHETAMINE SULFATE 6.25; 6.25; 6.25; 6.25 MG/1; MG/1; MG/1; MG/1
25 CAPSULE, EXTENDED RELEASE ORAL EVERY MORNING
Qty: 30 CAPSULE | Refills: 0 | Status: SHIPPED | OUTPATIENT
Start: 2024-01-02

## 2024-01-02 NOTE — TELEPHONE ENCOUNTER
Rx Refill Note  Requested Prescriptions     Pending Prescriptions Disp Refills    amphetamine-dextroamphetamine XR (Adderall XR) 25 MG 24 hr capsule 30 capsule 0     Sig: Take 1 capsule by mouth Every Morning      Last office visit with prescribing clinician: 12/4/2023   Next office visit with prescribing clinician: 3/4/2024    Last RF: 12/04/2023    CC: 12/13/2023    Tox: 12/13/2022     Anita Arizmendi CMA  01/02/24, 10:49 CST

## 2024-01-30 DIAGNOSIS — F90.9 ADULT ADHD: ICD-10-CM

## 2024-01-30 NOTE — TELEPHONE ENCOUNTER
Rx Refill Note  Requested Prescriptions     Pending Prescriptions Disp Refills    amphetamine-dextroamphetamine XR (Adderall XR) 25 MG 24 hr capsule 30 capsule 0     Sig: Take 1 capsule by mouth Every Morning      Last office visit with prescribing clinician: 12/4/2023   Next office visit with prescribing clinician: 3/4/2024                         Would you like a call back once the refill request has been completed: [] Yes [] No    If the office needs to give you a call back, can they leave a voicemail: [] Yes [] No    Dorota Barajas MA  01/30/24, 08:21 CST

## 2024-01-31 RX ORDER — DEXTROAMPHETAMINE SACCHARATE, AMPHETAMINE ASPARTATE MONOHYDRATE, DEXTROAMPHETAMINE SULFATE AND AMPHETAMINE SULFATE 6.25; 6.25; 6.25; 6.25 MG/1; MG/1; MG/1; MG/1
25 CAPSULE, EXTENDED RELEASE ORAL EVERY MORNING
Qty: 30 CAPSULE | Refills: 0 | Status: SHIPPED | OUTPATIENT
Start: 2024-01-31

## 2024-02-08 DIAGNOSIS — F41.9 ANXIETY AND DEPRESSION: ICD-10-CM

## 2024-02-08 DIAGNOSIS — F32.A ANXIETY AND DEPRESSION: ICD-10-CM

## 2024-02-08 RX ORDER — FLUOXETINE HYDROCHLORIDE 20 MG/1
20 CAPSULE ORAL DAILY
Qty: 90 CAPSULE | Refills: 0 | Status: SHIPPED | OUTPATIENT
Start: 2024-02-08

## 2024-02-08 NOTE — TELEPHONE ENCOUNTER
Rx Refill Note  Requested Prescriptions     Pending Prescriptions Disp Refills    FLUoxetine (PROzac) 20 MG capsule [Pharmacy Med Name: FLUoxetine HCl 20 MG Oral Capsule] 90 capsule 0     Sig: Take 1 capsule by mouth once daily      Last office visit with prescribing clinician: 7/5/2023   Last telemedicine visit with prescribing clinician: Visit date not found   Next office visit with prescribing clinician: Visit date not found                         Would you like a call back once the refill request has been completed: [] Yes [] No    If the office needs to give you a call back, can they leave a voicemail: [] Yes [] No    Gunjan Hanley MA  02/08/24, 08:30 CST

## 2024-03-04 ENCOUNTER — OFFICE VISIT (OUTPATIENT)
Dept: FAMILY MEDICINE CLINIC | Facility: CLINIC | Age: 29
End: 2024-03-04
Payer: COMMERCIAL

## 2024-03-04 VITALS
HEART RATE: 75 BPM | WEIGHT: 153.4 LBS | TEMPERATURE: 97.8 F | HEIGHT: 63 IN | DIASTOLIC BLOOD PRESSURE: 82 MMHG | SYSTOLIC BLOOD PRESSURE: 115 MMHG | BODY MASS INDEX: 27.18 KG/M2 | RESPIRATION RATE: 16 BRPM | OXYGEN SATURATION: 100 %

## 2024-03-04 DIAGNOSIS — Z79.899 MEDICATION MANAGEMENT: Primary | ICD-10-CM

## 2024-03-04 DIAGNOSIS — F90.9 ADULT ADHD: ICD-10-CM

## 2024-03-04 RX ORDER — DEXTROAMPHETAMINE SACCHARATE, AMPHETAMINE ASPARTATE MONOHYDRATE, DEXTROAMPHETAMINE SULFATE AND AMPHETAMINE SULFATE 6.25; 6.25; 6.25; 6.25 MG/1; MG/1; MG/1; MG/1
25 CAPSULE, EXTENDED RELEASE ORAL EVERY MORNING
Qty: 30 CAPSULE | Refills: 0 | Status: SHIPPED | OUTPATIENT
Start: 2024-03-04

## 2024-03-04 NOTE — PROGRESS NOTES
"Simba Caban is a 28 y.o. female.     History of Present Illness     The following portions of the patient's history were reviewed and updated as appropriate: allergies, current medications, past family history, past medical history, past social history, past surgical history, and problem list.      The patient is a 28-year-old female who presents for a follow-up on her prescriptions.    She feels everything is working well for her. She denies any new problems or concerns. Her blood pressure in the office is 115/82 mmHg, oxygen level is within normal limits and her weight has remained stable.     She is currently taking Prozac 20 mg daily and Adderall XR 25 mg to manage attention deficit hyperactivity disorder.     Review of Systems    Objective   /82 (BP Location: Left arm, Patient Position: Sitting, Cuff Size: Adult)   Pulse 75   Temp 97.8 °F (36.6 °C) (Infrared)   Resp 16   Ht 160 cm (63\")   Wt 69.6 kg (153 lb 6.4 oz)   SpO2 100%   BMI 27.17 kg/m²   Physical Exam  Vitals and nursing note reviewed.   Constitutional:       General: She is not in acute distress.     Appearance: She is well-developed. She is not diaphoretic.   HENT:      Head: Normocephalic and atraumatic.      Right Ear: External ear normal.      Left Ear: External ear normal.      Nose: Nose normal.   Eyes:      General:         Right eye: No discharge.         Left eye: No discharge.      Conjunctiva/sclera: Conjunctivae normal.   Neck:      Thyroid: No thyromegaly.      Trachea: No tracheal deviation.   Cardiovascular:      Rate and Rhythm: Normal rate and regular rhythm.      Heart sounds: Normal heart sounds.   Pulmonary:      Effort: Pulmonary effort is normal. No respiratory distress.      Breath sounds: Normal breath sounds. No stridor. No wheezing.   Chest:      Chest wall: No tenderness.   Musculoskeletal:         General: Normal range of motion.      Cervical back: Normal range of motion.   Lymphadenopathy:    "   Cervical: No cervical adenopathy.   Skin:     General: Skin is warm and dry.   Neurological:      Mental Status: She is alert and oriented to person, place, and time.      Motor: No abnormal muscle tone.      Coordination: Coordination normal.   Psychiatric:         Behavior: Behavior normal.         Thought Content: Thought content normal.         Judgment: Judgment normal.                 Assessment & Plan   Problems Addressed this Visit    None  Visit Diagnoses       Adult ADHD        Relevant Medications    amphetamine-dextroamphetamine XR (Adderall XR) 25 MG 24 hr capsule          Diagnoses         Codes Comments    Adult ADHD     ICD-10-CM: F90.9  ICD-9-CM: 314.01             1. ADHD, chronic, controlled.  She will continue on existing prescriptions. The patient reports medication is working well. She denies any side effects. We will continue on existing prescription. Controlled contract in the chart. UDS to be obtained while patient is in office today. BRIDGET was reviewed and appropriate.    Follow-up  Return in 3 months or sooner if needed.    Transcribed from ambient dictation for Mi Belle MD by Ruma Lemus.  03/04/24   09:17 CST    Patient or patient representative verbalized consent to the visit recording.            This document has been electronically signed by Mi Belle MD on March 19, 2024 14:16 CDT

## 2024-03-10 LAB — DRUGS UR: NORMAL

## 2024-04-01 DIAGNOSIS — F90.9 ADULT ADHD: ICD-10-CM

## 2024-04-01 RX ORDER — DEXTROAMPHETAMINE SACCHARATE, AMPHETAMINE ASPARTATE MONOHYDRATE, DEXTROAMPHETAMINE SULFATE AND AMPHETAMINE SULFATE 6.25; 6.25; 6.25; 6.25 MG/1; MG/1; MG/1; MG/1
25 CAPSULE, EXTENDED RELEASE ORAL EVERY MORNING
Qty: 30 CAPSULE | Refills: 0 | Status: SHIPPED | OUTPATIENT
Start: 2024-04-01

## 2024-04-01 NOTE — TELEPHONE ENCOUNTER
Rx Refill Note  Requested Prescriptions     Pending Prescriptions Disp Refills    amphetamine-dextroamphetamine XR (Adderall XR) 25 MG 24 hr capsule 30 capsule 0     Sig: Take 1 capsule by mouth Every Morning      Last office visit with prescribing clinician: 3/4/2024   Next office visit with prescribing clinician: 6/4/2024    Last RF: 3/4/2024    CC: 12/13/2023    UDS: 3/4/2024    Anita Arizmendi CMA  04/01/24, 12:19 CDT

## 2024-04-29 DIAGNOSIS — F90.9 ADULT ADHD: ICD-10-CM

## 2024-04-29 RX ORDER — DEXTROAMPHETAMINE SACCHARATE, AMPHETAMINE ASPARTATE MONOHYDRATE, DEXTROAMPHETAMINE SULFATE AND AMPHETAMINE SULFATE 6.25; 6.25; 6.25; 6.25 MG/1; MG/1; MG/1; MG/1
25 CAPSULE, EXTENDED RELEASE ORAL EVERY MORNING
Qty: 30 CAPSULE | Refills: 0 | Status: SHIPPED | OUTPATIENT
Start: 2024-04-29

## 2024-04-29 NOTE — TELEPHONE ENCOUNTER
Rx Refill Note  Requested Prescriptions     Pending Prescriptions Disp Refills    amphetamine-dextroamphetamine XR (Adderall XR) 25 MG 24 hr capsule 30 capsule 0     Sig: Take 1 capsule by mouth Every Morning      Last office visit with prescribing clinician: 3/4/2024   Next office visit with prescribing clinician: 6/4/2024    Last RF: 4/1/2023    CC: 12/13/2023    UDS: 3/4/2024    Anita Arizmendi, MALINI  04/29/24, 10:11 CDT

## 2024-05-11 DIAGNOSIS — F41.9 ANXIETY AND DEPRESSION: ICD-10-CM

## 2024-05-11 DIAGNOSIS — F32.A ANXIETY AND DEPRESSION: ICD-10-CM

## 2024-05-14 RX ORDER — FLUOXETINE HYDROCHLORIDE 20 MG/1
20 CAPSULE ORAL DAILY
Qty: 90 CAPSULE | Refills: 0 | Status: SHIPPED | OUTPATIENT
Start: 2024-05-14

## 2024-05-14 NOTE — TELEPHONE ENCOUNTER
Rx Refill Note  Requested Prescriptions     Pending Prescriptions Disp Refills    FLUoxetine (PROzac) 20 MG capsule [Pharmacy Med Name: FLUoxetine HCl 20 MG Oral Capsule] 90 capsule 0     Sig: Take 1 capsule by mouth once daily      Last office visit with prescribing clinician: 3/4/2024   Last telemedicine visit with prescribing clinician: Visit date not found   Next office visit with prescribing clinician: 6/4/2024                         Would you like a call back once the refill request has been completed: [] Yes [] No    If the office needs to give you a call back, can they leave a voicemail: [] Yes [] No    Gunjan Hanley MA  05/14/24, 09:39 CDT

## 2024-05-29 ENCOUNTER — OFFICE VISIT (OUTPATIENT)
Dept: FAMILY MEDICINE CLINIC | Facility: CLINIC | Age: 29
End: 2024-05-29
Payer: COMMERCIAL

## 2024-05-29 VITALS
BODY MASS INDEX: 27.14 KG/M2 | WEIGHT: 153.2 LBS | DIASTOLIC BLOOD PRESSURE: 81 MMHG | HEART RATE: 83 BPM | OXYGEN SATURATION: 98 % | HEIGHT: 63 IN | TEMPERATURE: 98.6 F | RESPIRATION RATE: 18 BRPM | SYSTOLIC BLOOD PRESSURE: 117 MMHG

## 2024-05-29 DIAGNOSIS — L98.9 SKIN LESION: ICD-10-CM

## 2024-05-29 DIAGNOSIS — F90.9 ADULT ADHD: Primary | ICD-10-CM

## 2024-05-29 PROCEDURE — 99213 OFFICE O/P EST LOW 20 MIN: CPT | Performed by: FAMILY MEDICINE

## 2024-05-29 RX ORDER — DEXTROAMPHETAMINE SACCHARATE, AMPHETAMINE ASPARTATE MONOHYDRATE, DEXTROAMPHETAMINE SULFATE AND AMPHETAMINE SULFATE 6.25; 6.25; 6.25; 6.25 MG/1; MG/1; MG/1; MG/1
25 CAPSULE, EXTENDED RELEASE ORAL EVERY MORNING
Qty: 30 CAPSULE | Refills: 0 | Status: SHIPPED | OUTPATIENT
Start: 2024-05-29

## 2024-05-29 NOTE — ASSESSMENT & PLAN NOTE
The condition is chronic yet well-managed. The patient is advised to persist with her existing prescription regimen. A controlled contract in the chart has been established, and the BRIDGET has been reviewed and found to be appropriate today.

## 2024-05-29 NOTE — PROGRESS NOTES
"Subjective   Mi Caban is a 28 y.o. female.     History of Present Illness     The patient is a 28-year-old female who presents for evaluation of multiple medical concerns.    The patient reports satisfactory management of her ADHD symptoms, with no reported side effects or complications associated with her medication. Her current treatment regimen includes Adderall XR 25 mg, administered once daily.    The patient experienced sporadic dizziness a few months prior, with 2 episodes occurring a few weeks apart. The second episode occurred while she was driving, during which she experienced a jolting sensation when moving quickly. She hypothesizes that these episodes were not related to her blood pressure. She has sought chiropractic intervention, which has provided some relief. She has not experienced any further episodes of dizziness in the past 3 months.    The patient reports a mole on her left forearm that has changed in color. Initially, it caused irritation and itching. However, it eventually disappeared, felt off, and has since darkened.    She continues to take Prozac once a day and it is working well for her.    Her grandmother has severe vertigo. The patient's blood pressure is 117/81 mmHg.      The following portions of the patient's history were reviewed and updated as appropriate: allergies, current medications, past family history, past medical history, past social history, past surgical history, and problem list.        Review of Systems    A review of systems was performed, and the pertinent positives are noted in the HPI.      Objective   /81 (BP Location: Left arm, Patient Position: Sitting, Cuff Size: Adult)   Pulse 83   Temp 98.6 °F (37 °C) (Infrared)   Resp 18   Ht 160 cm (63\")   Wt 69.5 kg (153 lb 3.2 oz)   SpO2 98%   BMI 27.14 kg/m²   Physical Exam  Vitals and nursing note reviewed.   Constitutional:       General: She is not in acute distress.     Appearance: Normal " appearance. She is well-developed. She is not diaphoretic.   HENT:      Head: Normocephalic and atraumatic.      Right Ear: External ear normal.      Left Ear: External ear normal.      Nose: Nose normal.   Eyes:      General:         Right eye: No discharge.         Left eye: No discharge.      Conjunctiva/sclera: Conjunctivae normal.   Neck:      Thyroid: No thyromegaly.      Trachea: No tracheal deviation.   Cardiovascular:      Rate and Rhythm: Normal rate and regular rhythm.      Heart sounds: Normal heart sounds.   Pulmonary:      Effort: Pulmonary effort is normal. No respiratory distress.      Breath sounds: Normal breath sounds. No stridor. No wheezing.   Chest:      Chest wall: No tenderness.   Musculoskeletal:         General: Normal range of motion.      Cervical back: Normal range of motion.   Skin:     General: Skin is warm and dry.          Neurological:      Mental Status: She is alert and oriented to person, place, and time.      Motor: No abnormal muscle tone.      Coordination: Coordination normal.   Psychiatric:         Behavior: Behavior normal.         Thought Content: Thought content normal.         Judgment: Judgment normal.                Assessment & Plan   Problems Addressed this Visit          Mental Health    Adult ADHD - Primary     The condition is chronic yet well-managed. The patient is advised to persist with her existing prescription regimen. A controlled contract in the chart has been established, and the BRIDGET has been reviewed and found to be appropriate today.         Relevant Medications    amphetamine-dextroamphetamine XR (Adderall XR) 25 MG 24 hr capsule     Other Visit Diagnoses       Skin lesion        A photograph of the skin lesion will be added to the patient's chart for reference. The patient will be referred to dermatology for further evaluation.    Relevant Orders    Ambulatory Referral to Dermatology          Diagnoses         Codes Comments    Adult ADHD    -   Primary ICD-10-CM: F90.9  ICD-9-CM: 314.01     Skin lesion     ICD-10-CM: L98.9  ICD-9-CM: 709.9 A photograph of the skin lesion will be added to the patient's chart for reference. The patient will be referred to dermatology for further evaluation.                   Transcribed from ambient dictation for Mi Belle MD by Mabel Omer.  05/29/24   09:57 CDT    Patient or patient representative verbalized consent to the visit recording.  I have personally performed the services described in this document as transcribed by the above individual, and it is both accurate and complete.          This document has been electronically signed by Mi Belle MD on Tina 3, 2024 13:45 CDT

## 2024-06-12 ENCOUNTER — TELEPHONE (OUTPATIENT)
Dept: FAMILY MEDICINE CLINIC | Facility: CLINIC | Age: 29
End: 2024-06-12
Payer: COMMERCIAL

## 2024-06-12 DIAGNOSIS — F90.9 ADULT ADHD: ICD-10-CM

## 2024-06-12 RX ORDER — DEXTROAMPHETAMINE SACCHARATE, AMPHETAMINE ASPARTATE MONOHYDRATE, DEXTROAMPHETAMINE SULFATE AND AMPHETAMINE SULFATE 6.25; 6.25; 6.25; 6.25 MG/1; MG/1; MG/1; MG/1
25 CAPSULE, EXTENDED RELEASE ORAL EVERY MORNING
Qty: 30 CAPSULE | Refills: 0 | Status: CANCELLED | OUTPATIENT
Start: 2024-06-12

## 2024-06-12 RX ORDER — DEXTROAMPHETAMINE SULFATE, DEXTROAMPHETAMINE SACCHARATE, AMPHETAMINE SULFATE AND AMPHETAMINE ASPARTATE 6.25; 6.25; 6.25; 6.25 MG/1; MG/1; MG/1; MG/1
25 CAPSULE, EXTENDED RELEASE ORAL EVERY MORNING
Qty: 20 CAPSULE | Refills: 0 | Status: SHIPPED | OUTPATIENT
Start: 2024-06-12

## 2024-06-12 NOTE — TELEPHONE ENCOUNTER
Caller: Arsh Mi    Relationship: Self    Best call back number: [phone]    Requested Prescriptions:   Requested Prescriptions     Pending Prescriptions Disp Refills    amphetamine-dextroamphetamine XR (Adderall XR) 25 MG 24 hr capsule 30 capsule 0     Sig: Take 1 capsule by mouth Every Morning        Pharmacy where request should be sent: [facility]  [place], [place] - [address] - [phone]  - [phone] FX     Last office visit with prescribing clinician: 5/29/2024   Last telemedicine visit with prescribing clinician: Visit date not found   Next office visit with prescribing clinician: 7/12/2024     Additional details provided by patient: INSURANCE SAYS TO GET FULL 30 DAY OF BRAND NAME     Does the patient have less than a 3 day supply:  [x] Yes  [] No    Would you like a call back once the refill request has been completed: [x] Yes [] No    If the office needs to give you a call back, can they leave a voicemail: [x] Yes [] No    Dionicio Rubalcava III, [place] Rep   06/12/24 10:42 CDT

## 2024-06-12 NOTE — TELEPHONE ENCOUNTER
Rx Refill Note  Requested Prescriptions     Pending Prescriptions Disp Refills    Adderall XR 25 MG 24 hr capsule 30 capsule 0     Sig: Take 1 capsule by mouth Every Morning      Last office visit with prescribing clinician: 5/29/2024     Next office visit with prescribing clinician: 7/12/2024     LRX:5/29/2024  UDS:3/2024  CSA:12/13/2024    Sunni Snell MA  06/12/24, 11:00 CDT        Called pharmacy back to see last time pt picked up medication- reports last fill date was 5/29/2024 for a qty of 10 caps due to availability. Requesting new rx for remaining 20 caps.      Called pt back to notify that ins may require PA for name brand and if so will take a few days for ins to process- told her I would follow up with her after determination has returned. Pt agreeable

## 2024-06-13 NOTE — TELEPHONE ENCOUNTER
Called pharmacy to follow up- reports that name brand went through ins and pt has picked up medication.

## 2024-06-29 DIAGNOSIS — F90.9 ADULT ADHD: ICD-10-CM

## 2024-07-01 RX ORDER — DEXTROAMPHETAMINE SULFATE, DEXTROAMPHETAMINE SACCHARATE, AMPHETAMINE SULFATE AND AMPHETAMINE ASPARTATE 6.25; 6.25; 6.25; 6.25 MG/1; MG/1; MG/1; MG/1
25 CAPSULE, EXTENDED RELEASE ORAL EVERY MORNING
Qty: 30 CAPSULE | Refills: 0 | Status: SHIPPED | OUTPATIENT
Start: 2024-07-01

## 2024-07-01 NOTE — TELEPHONE ENCOUNTER
Rx Refill Note  Requested Prescriptions     Pending Prescriptions Disp Refills    Adderall XR 25 MG 24 hr capsule 20 capsule 0     Sig: Take 1 capsule by mouth Every Morning      Last office visit with prescribing clinician: 05/29/2024  Next office visit with prescribing clinician: 7/12/2024    Last RF: 6/12/2024    CC: 12/13/2023    UDS: 3/4/2024    Anita Arizmendi CMA  07/01/24, 16:18 CDT

## 2024-07-06 DIAGNOSIS — F90.9 ADULT ADHD: ICD-10-CM

## 2024-07-09 NOTE — TELEPHONE ENCOUNTER
Patient called to check on status of this encounter. I told her the message has been sent to Dr. Belle to view. Please call her back at [phone].

## 2024-07-09 NOTE — TELEPHONE ENCOUNTER
Patient comment: The generic I was given is making me sick. Its giving me migraine headaches, dizziness, nausea and mood swings. Is it possible to send in another prescription but for the name brand? I will give the pharmacy the generic to dispose of.     Last RF: 7/1/2024--generic    CC: 12/13/2023    UDS: 3/4/2024

## 2024-07-10 RX ORDER — DEXTROAMPHETAMINE SULFATE, DEXTROAMPHETAMINE SACCHARATE, AMPHETAMINE SULFATE AND AMPHETAMINE ASPARTATE 6.25; 6.25; 6.25; 6.25 MG/1; MG/1; MG/1; MG/1
25 CAPSULE, EXTENDED RELEASE ORAL EVERY MORNING
Qty: 30 CAPSULE | Refills: 0 | Status: SHIPPED | OUTPATIENT
Start: 2024-07-10

## 2024-07-12 ENCOUNTER — OFFICE VISIT (OUTPATIENT)
Dept: FAMILY MEDICINE CLINIC | Facility: CLINIC | Age: 29
End: 2024-07-12
Payer: COMMERCIAL

## 2024-07-12 VITALS
HEIGHT: 64 IN | TEMPERATURE: 98.4 F | SYSTOLIC BLOOD PRESSURE: 110 MMHG | RESPIRATION RATE: 16 BRPM | OXYGEN SATURATION: 99 % | WEIGHT: 151 LBS | DIASTOLIC BLOOD PRESSURE: 76 MMHG | BODY MASS INDEX: 25.78 KG/M2 | HEART RATE: 85 BPM

## 2024-07-12 DIAGNOSIS — Z13.1 DIABETES MELLITUS SCREENING: ICD-10-CM

## 2024-07-12 DIAGNOSIS — Z00.00 WELL ADULT EXAM: Primary | ICD-10-CM

## 2024-07-12 DIAGNOSIS — Z13.0 SCREENING FOR DEFICIENCY ANEMIA: ICD-10-CM

## 2024-07-12 DIAGNOSIS — E55.9 VITAMIN D DEFICIENCY: ICD-10-CM

## 2024-07-12 DIAGNOSIS — Z13.220 LIPID SCREENING: ICD-10-CM

## 2024-07-12 PROCEDURE — 99395 PREV VISIT EST AGE 18-39: CPT | Performed by: FAMILY MEDICINE

## 2024-07-12 NOTE — PROGRESS NOTES
"Subjective   Mi Caban is a 29 y.o. female who presents for adult well exam.  Pap smear done a few months ago in [place] - normal per pt   Vitals normal   Weight trending down - pt working on diet/exercise   Reviewed past labs - will repeat today   Immunizatoin sUTD     History of Present Illness      Results      The following portions of the patient's history were reviewed and updated as appropriate: allergies, current medications, past family history, past medical history, past social history, past surgical history, and problem list.        A review of systems was performed, and pertinent findings are noted in the HPI.    Objective   /76 (BP Location: Right arm, Patient Position: Sitting, Cuff Size: Adult)   Pulse 85   Temp 98.4 °F (36.9 °C) (Infrared)   Resp 16   Ht 161.3 cm (63.5\")   Wt 68.5 kg (151 lb)   SpO2 99%   BMI 26.33 kg/m²    BMI is >= 25 and <30. (Overweight) The following options were offered after discussion;: exercise counseling/recommendations and nutrition counseling/recommendations     Physical Exam  Vitals and nursing note reviewed.   Constitutional:       General: She is not in acute distress.     Appearance: She is well-developed. She is not diaphoretic.   HENT:      Head: Normocephalic and atraumatic.      Right Ear: External ear normal.      Left Ear: External ear normal.      Nose: Nose normal.   Eyes:      General:         Right eye: No discharge.         Left eye: No discharge.      Conjunctiva/sclera: Conjunctivae normal.   Neck:      Thyroid: No thyromegaly.      Trachea: No tracheal deviation.   Cardiovascular:      Rate and Rhythm: Normal rate and regular rhythm.      Heart sounds: Normal heart sounds.   Pulmonary:      Effort: Pulmonary effort is normal. No respiratory distress.      Breath sounds: Normal breath sounds. No stridor. No wheezing.   Chest:      Chest wall: No tenderness.   Abdominal:      General: Bowel sounds are normal. There is no distension.      " Palpations: Abdomen is soft.      Tenderness: There is no abdominal tenderness.   Musculoskeletal:         General: Normal range of motion.      Cervical back: Normal range of motion.   Lymphadenopathy:      Cervical: No cervical adenopathy.   Skin:     General: Skin is warm and dry.   Neurological:      Mental Status: She is alert and oriented to person, place, and time.      Motor: No abnormal muscle tone.      Coordination: Coordination normal.   Psychiatric:         Behavior: Behavior normal.         Thought Content: Thought content normal.         Judgment: Judgment normal.         Assessment & Plan   Problems Addressed this Visit    None  Visit Diagnoses       Screening for deficiency anemia    -  Primary    Relevant Orders    CBC No Differential    Lipid screening        Relevant Orders    Lipid panel    Diabetes mellitus screening        Relevant Orders    Comprehensive metabolic panel    Vitamin D deficiency        Relevant Orders    Vitamin D 25 hydroxy          Diagnoses         Codes Comments    Screening for deficiency anemia    -  Primary ICD-10-CM: Z13.0  ICD-9-CM: V78.1     Lipid screening     ICD-10-CM: Z13.220  ICD-9-CM: V77.91     Diabetes mellitus screening     ICD-10-CM: Z13.1  ICD-9-CM: V77.1     Vitamin D deficiency     ICD-10-CM: E55.9  ICD-9-CM: 268.9             Assessment & Plan      PLAN:       #1 adult well exam: vitals normal, advised on diet/lifestyle changes, labs today, pap UTD - will request record, reviewed immunizations - UTD, dental exam UTD            Transcribed from ambient dictation for Mi Belle MD by Mi Belle MD.  07/12/24   07:52 CDT    Patient or patient representative verbalized consent for the use of Ambient Listening during the visit with  Mi Belle MD for chart documentation. 7/12/2024  07:52 CDT

## 2024-07-13 LAB
25(OH)D3+25(OH)D2 SERPL-MCNC: 91 NG/ML (ref 30–100)
ALBUMIN SERPL-MCNC: 4.6 G/DL (ref 4–5)
ALP SERPL-CCNC: 80 IU/L (ref 44–121)
ALT SERPL-CCNC: 13 IU/L (ref 0–32)
AST SERPL-CCNC: 18 IU/L (ref 0–40)
BILIRUB SERPL-MCNC: 0.4 MG/DL (ref 0–1.2)
BUN SERPL-MCNC: 15 MG/DL (ref 6–20)
BUN/CREAT SERPL: 15 (ref 9–23)
CALCIUM SERPL-MCNC: 9.6 MG/DL (ref 8.7–10.2)
CHLORIDE SERPL-SCNC: 100 MMOL/L (ref 96–106)
CHOLEST SERPL-MCNC: 191 MG/DL (ref 100–199)
CO2 SERPL-SCNC: 24 MMOL/L (ref 20–29)
CREAT SERPL-MCNC: 0.97 MG/DL (ref 0.57–1)
EGFRCR SERPLBLD CKD-EPI 2021: 82 ML/MIN/1.73
ERYTHROCYTE [DISTWIDTH] IN BLOOD BY AUTOMATED COUNT: 11.9 % (ref 11.7–15.4)
GLOBULIN SER CALC-MCNC: 2.7 G/DL (ref 1.5–4.5)
GLUCOSE SERPL-MCNC: 75 MG/DL (ref 70–99)
HCT VFR BLD AUTO: 43.2 % (ref 34–46.6)
HDLC SERPL-MCNC: 60 MG/DL
HGB BLD-MCNC: 14.1 G/DL (ref 11.1–15.9)
LDLC SERPL CALC-MCNC: 118 MG/DL (ref 0–99)
MCH RBC QN AUTO: 29.2 PG (ref 26.6–33)
MCHC RBC AUTO-ENTMCNC: 32.6 G/DL (ref 31.5–35.7)
MCV RBC AUTO: 89 FL (ref 79–97)
PLATELET # BLD AUTO: 272 X10E3/UL (ref 150–450)
POTASSIUM SERPL-SCNC: 4.2 MMOL/L (ref 3.5–5.2)
PROT SERPL-MCNC: 7.3 G/DL (ref 6–8.5)
RBC # BLD AUTO: 4.83 X10E6/UL (ref 3.77–5.28)
SODIUM SERPL-SCNC: 138 MMOL/L (ref 134–144)
TRIGL SERPL-MCNC: 69 MG/DL (ref 0–149)
VLDLC SERPL CALC-MCNC: 13 MG/DL (ref 5–40)
WBC # BLD AUTO: 5.7 X10E3/UL (ref 3.4–10.8)

## 2024-07-24 ENCOUNTER — PATIENT MESSAGE (OUTPATIENT)
Dept: FAMILY MEDICINE CLINIC | Facility: CLINIC | Age: 29
End: 2024-07-24
Payer: COMMERCIAL

## 2024-07-24 ENCOUNTER — TELEPHONE (OUTPATIENT)
Dept: FAMILY MEDICINE CLINIC | Facility: CLINIC | Age: 29
End: 2024-07-24
Payer: COMMERCIAL

## 2024-07-24 NOTE — TELEPHONE ENCOUNTER
PA denied for Adderall XR 25mg(name brand). Pt must have tried/failed one formulary alternative:  Methylphendiate ER-pt tried in 2019  dexmethylphenidate ER  Lisdexamfetamine- pt tried in 2020    Will submit an appeal for coverage.   [employer] message sent to pt to notify.

## 2024-08-05 DIAGNOSIS — F90.9 ADULT ADHD: ICD-10-CM

## 2024-08-05 NOTE — TELEPHONE ENCOUNTER
Rx Refill Note  Requested Prescriptions     Pending Prescriptions Disp Refills    Adderall XR 25 MG 24 hr capsule 30 capsule 0     Sig: Take 1 capsule by mouth Every Morning      Last office visit with prescribing clinician: 7/12/2024   Last telemedicine visit with prescribing clinician: Visit date not found   Next office visit with prescribing clinician: 8/29/2024                         Would you like a call back once the refill request has been completed: [] Yes [] No    If the office needs to give you a call back, can they leave a voicemail: [] Yes [] No    Gunjan Hanley MA  08/05/24, 14:43 CDT

## 2024-08-06 RX ORDER — DEXTROAMPHETAMINE SULFATE, DEXTROAMPHETAMINE SACCHARATE, AMPHETAMINE SULFATE AND AMPHETAMINE ASPARTATE 6.25; 6.25; 6.25; 6.25 MG/1; MG/1; MG/1; MG/1
25 CAPSULE, EXTENDED RELEASE ORAL EVERY MORNING
Qty: 30 CAPSULE | Refills: 0 | Status: SHIPPED | OUTPATIENT
Start: 2024-08-06

## 2024-08-11 DIAGNOSIS — F41.9 ANXIETY AND DEPRESSION: ICD-10-CM

## 2024-08-11 DIAGNOSIS — F32.A ANXIETY AND DEPRESSION: ICD-10-CM

## 2024-08-12 RX ORDER — FLUOXETINE HYDROCHLORIDE 20 MG/1
20 CAPSULE ORAL DAILY
Qty: 90 CAPSULE | Refills: 0 | Status: SHIPPED | OUTPATIENT
Start: 2024-08-12

## 2024-08-29 ENCOUNTER — OFFICE VISIT (OUTPATIENT)
Dept: FAMILY MEDICINE CLINIC | Facility: CLINIC | Age: 29
End: 2024-08-29
Payer: COMMERCIAL

## 2024-08-29 VITALS
BODY MASS INDEX: 27.29 KG/M2 | RESPIRATION RATE: 18 BRPM | WEIGHT: 154 LBS | OXYGEN SATURATION: 100 % | HEART RATE: 72 BPM | SYSTOLIC BLOOD PRESSURE: 111 MMHG | DIASTOLIC BLOOD PRESSURE: 73 MMHG | TEMPERATURE: 97.7 F | HEIGHT: 63 IN

## 2024-08-29 DIAGNOSIS — F90.9 ADULT ADHD: ICD-10-CM

## 2024-08-29 PROCEDURE — 99213 OFFICE O/P EST LOW 20 MIN: CPT | Performed by: FAMILY MEDICINE

## 2024-08-29 RX ORDER — DEXTROAMPHETAMINE SULFATE, DEXTROAMPHETAMINE SACCHARATE, AMPHETAMINE SULFATE AND AMPHETAMINE ASPARTATE 6.25; 6.25; 6.25; 6.25 MG/1; MG/1; MG/1; MG/1
25 CAPSULE, EXTENDED RELEASE ORAL EVERY MORNING
Qty: 30 CAPSULE | Refills: 0 | Status: SHIPPED | OUTPATIENT
Start: 2024-08-29

## 2024-08-29 NOTE — PROGRESS NOTES
"Subjective   Mi Caban is a 29 y.o. female.     History of Present Illness  The patient presents for evaluation of ADHD.    He reports a positive response to his current ADHD medication, Adderall XR 25 mg, taken once daily. He has not experienced any side effects or new issues related to the medication.    Results      The following portions of the patient's history were reviewed and updated as appropriate: allergies, current medications, past family history, past medical history, past social history, past surgical history, and problem list.        A review of systems was performed, and pertinent findings are noted in the HPI.    Objective   /73 (BP Location: Left arm, Patient Position: Sitting, Cuff Size: Large Adult)   Pulse 72   Temp 97.7 °F (36.5 °C) (Infrared)   Resp 18   Ht 160 cm (63\")   Wt 69.9 kg (154 lb)   SpO2 100%   BMI 27.28 kg/m²          Physical Exam  Vitals and nursing note reviewed.   Constitutional:       General: She is not in acute distress.     Appearance: She is well-developed. She is not diaphoretic.   HENT:      Head: Normocephalic and atraumatic.      Right Ear: External ear normal.      Left Ear: External ear normal.      Nose: Nose normal.   Eyes:      General:         Right eye: No discharge.         Left eye: No discharge.      Conjunctiva/sclera: Conjunctivae normal.   Neck:      Thyroid: No thyromegaly.      Trachea: No tracheal deviation.   Cardiovascular:      Rate and Rhythm: Normal rate and regular rhythm.      Heart sounds: Normal heart sounds.   Pulmonary:      Effort: Pulmonary effort is normal. No respiratory distress.      Breath sounds: Normal breath sounds. No stridor. No wheezing.   Chest:      Chest wall: No tenderness.   Musculoskeletal:         General: Normal range of motion.      Cervical back: Normal range of motion.   Skin:     General: Skin is warm and dry.   Neurological:      Mental Status: She is alert and oriented to person, place, and time. "      Motor: No abnormal muscle tone.      Coordination: Coordination normal.   Psychiatric:         Behavior: Behavior normal.         Thought Content: Thought content normal.         Judgment: Judgment normal.         Assessment & Plan   Problems Addressed this Visit          Mental Health    Adult ADHD    Relevant Medications    Adderall XR 25 MG 24 hr capsule     Diagnoses         Codes Comments    Adult ADHD     ICD-10-CM: F90.9  ICD-9-CM: 314.01             Assessment & Plan  1. ADHD, chronic controlled.  Holiday reviewed and appropriate. Urine drug screen currently up to date. Continue on medication. Refill given.               Transcribed from ambient dictation for Mi Belle MD by Mi Belle MD.  08/29/24   07:56 CDT    Patient or patient representative verbalized consent for the use of Ambient Listening during the visit with  Mi Belle MD for chart documentation. 8/29/2024  07:56 CDT

## 2024-10-04 DIAGNOSIS — F90.9 ADULT ADHD: ICD-10-CM

## 2024-10-04 RX ORDER — DEXTROAMPHETAMINE SULFATE, DEXTROAMPHETAMINE SACCHARATE, AMPHETAMINE SULFATE AND AMPHETAMINE ASPARTATE 6.25; 6.25; 6.25; 6.25 MG/1; MG/1; MG/1; MG/1
25 CAPSULE, EXTENDED RELEASE ORAL EVERY MORNING
Qty: 30 CAPSULE | Refills: 0 | Status: SHIPPED | OUTPATIENT
Start: 2024-10-04

## 2024-10-04 NOTE — TELEPHONE ENCOUNTER
UDS 3/4/2024  CC needed @ next visit   Rx Refill Note  Requested Prescriptions     Pending Prescriptions Disp Refills    Adderall XR 25 MG 24 hr capsule 30 capsule 0     Sig: Take 1 capsule by mouth Every Morning      Last office visit with prescribing clinician: 8/29/2024   Last telemedicine visit with prescribing clinician: Visit date not found   Next office visit with prescribing clinician: 11/26/2024                         Would you like a call back once the refill request has been completed: [] Yes [] No    If the office needs to give you a call back, can they leave a voicemail: [] Yes [] No    Gunjan Hanley MA  10/04/24, 07:36 CDT

## 2024-11-03 DIAGNOSIS — F90.9 ADULT ADHD: ICD-10-CM

## 2024-11-06 NOTE — TELEPHONE ENCOUNTER
Rx Refill Note  Requested Prescriptions     Pending Prescriptions Disp Refills    Adderall XR 25 MG 24 hr capsule 30 capsule 0     Sig: Take 1 capsule by mouth Every Morning      Last office visit with prescribing clinician: 8/29/2024   Last telemedicine visit with prescribing clinician: Visit date not found   Next office visit with prescribing clinician: 11/26/2024                         Would you like a call back once the refill request has been completed: [] Yes [] No    If the office needs to give you a call back, can they leave a voicemail: [] Yes [] No    Cheryl Hinds LPN  11/06/24, 08:16 CST

## 2024-11-07 ENCOUNTER — TELEPHONE (OUTPATIENT)
Dept: FAMILY MEDICINE CLINIC | Facility: CLINIC | Age: 29
End: 2024-11-07
Payer: COMMERCIAL

## 2024-11-07 RX ORDER — DEXTROAMPHETAMINE SULFATE, DEXTROAMPHETAMINE SACCHARATE, AMPHETAMINE SULFATE AND AMPHETAMINE ASPARTATE 6.25; 6.25; 6.25; 6.25 MG/1; MG/1; MG/1; MG/1
25 CAPSULE, EXTENDED RELEASE ORAL EVERY MORNING
Qty: 30 CAPSULE | Refills: 0 | Status: SHIPPED | OUTPATIENT
Start: 2024-11-07

## 2024-11-07 NOTE — TELEPHONE ENCOUNTER
Caller: Mi Caban    Relationship: Self    Best call back number: [phone]     What is the best time to reach you: ANY     Who are you requesting to speak with (clinical staff, provider,  specific staff member): NONE SPECIFIED     What was the call regarding:     PATIENT CALLED TO CHECK ON THE STATUS OF 11.03.24 REFILL REQUEST. PATIENT STATES SHE IS COMPLETLEY OUT OF ADDERALL.     Is it okay if the provider responds through [employer]: PLEASE CALL

## 2024-11-10 DIAGNOSIS — F32.A ANXIETY AND DEPRESSION: ICD-10-CM

## 2024-11-10 DIAGNOSIS — F41.9 ANXIETY AND DEPRESSION: ICD-10-CM

## 2024-11-11 NOTE — TELEPHONE ENCOUNTER
Rx Refill Note  Requested Prescriptions     Pending Prescriptions Disp Refills    FLUoxetine (PROzac) 20 MG capsule [Pharmacy Med Name: FLUoxetine HCl 20 MG Oral Capsule] 90 capsule 0     Sig: Take 1 capsule by mouth once daily      Last office visit with prescribing clinician: 8/29/2024   Last telemedicine visit with prescribing clinician: Visit date not found   Next office visit with prescribing clinician: 11/26/2024                         Would you like a call back once the refill request has been completed: [] Yes [] No    If the office needs to give you a call back, can they leave a voicemail: [] Yes [] No    Cheryl Hinds LPN  11/11/24, 08:39 CST

## 2024-11-26 ENCOUNTER — OFFICE VISIT (OUTPATIENT)
Dept: FAMILY MEDICINE CLINIC | Facility: CLINIC | Age: 29
End: 2024-11-26
Payer: COMMERCIAL

## 2024-11-26 VITALS
WEIGHT: 159 LBS | HEART RATE: 76 BPM | DIASTOLIC BLOOD PRESSURE: 82 MMHG | OXYGEN SATURATION: 95 % | TEMPERATURE: 98.7 F | SYSTOLIC BLOOD PRESSURE: 121 MMHG | HEIGHT: 63 IN | BODY MASS INDEX: 28.17 KG/M2 | RESPIRATION RATE: 20 BRPM

## 2024-11-26 DIAGNOSIS — F90.9 ADULT ADHD: Primary | ICD-10-CM

## 2024-11-26 PROCEDURE — 99213 OFFICE O/P EST LOW 20 MIN: CPT | Performed by: FAMILY MEDICINE

## 2024-11-26 RX ORDER — DEXTROAMPHETAMINE SULFATE, DEXTROAMPHETAMINE SACCHARATE, AMPHETAMINE SULFATE AND AMPHETAMINE ASPARTATE 6.25; 6.25; 6.25; 6.25 MG/1; MG/1; MG/1; MG/1
25 CAPSULE, EXTENDED RELEASE ORAL EVERY MORNING
Qty: 30 CAPSULE | Refills: 0 | Status: SHIPPED | OUTPATIENT
Start: 2024-11-26

## 2024-11-26 NOTE — PROGRESS NOTES
"Subjective   Mi Caban is a 29 y.o. female.     History of Present Illness  The patient presents for a checkup.    She is doing well with ADHD medication     She reports experiencing numbness in her legs, which she has noticed tends to occur just before the onset of her menstrual cycle. This numbness typically lasts for the first or second day of her period and extends down her legs. She does not experience any pain while walking during these episodes. She also mentions that she has had similar numbness in her arms and hands during past migraine episodes, but this current numbness occurs without the presence of a migraine.    Results      The following portions of the patient's history were reviewed and updated as appropriate: allergies, current medications, past family history, past medical history, past social history, past surgical history, and problem list.        A review of systems was performed, and pertinent findings are noted in the HPI.    Objective   /82 (BP Location: Left arm, Patient Position: Sitting, Cuff Size: Adult)   Pulse 76   Temp 98.7 °F (37.1 °C) (Infrared)   Resp 20   Ht 160 cm (63\")   Wt 72.1 kg (159 lb)   SpO2 95%   BMI 28.17 kg/m²          Physical Exam  Vitals and nursing note reviewed.   Constitutional:       General: She is not in acute distress.     Appearance: She is well-developed. She is not diaphoretic.   HENT:      Head: Normocephalic and atraumatic.      Nose: Nose normal.   Eyes:      General:         Right eye: No discharge.         Left eye: No discharge.      Conjunctiva/sclera: Conjunctivae normal.   Neck:      Thyroid: No thyromegaly.      Trachea: No tracheal deviation.   Cardiovascular:      Rate and Rhythm: Normal rate and regular rhythm.      Pulses: Normal pulses.   Pulmonary:      Effort: Pulmonary effort is normal. No respiratory distress.      Breath sounds: Normal breath sounds. No stridor. No wheezing.   Chest:      Chest wall: No tenderness. "   Abdominal:      General: There is no distension.      Palpations: Abdomen is soft.      Tenderness: There is no abdominal tenderness.   Musculoskeletal:         General: Normal range of motion.      Cervical back: Normal range of motion.   Lymphadenopathy:      Cervical: No cervical adenopathy.   Skin:     General: Skin is warm and dry.   Neurological:      Mental Status: She is alert and oriented to person, place, and time.      Motor: No abnormal muscle tone.      Coordination: Coordination normal.   Psychiatric:         Behavior: Behavior normal.         Thought Content: Thought content normal.         Judgment: Judgment normal.         Assessment & Plan   Problems Addressed this Visit          Mental Health    Adult ADHD - Primary    Relevant Medications    Adderall XR 25 MG 24 hr capsule     Diagnoses         Codes Comments    Adult ADHD    -  Primary ICD-10-CM: F90.9  ICD-9-CM: 314.01             Assessment & Plan  1. Attention Deficit Hyperactivity Disorder (ADHD).  The condition is chronic but currently under control. Continuation of the current medication regimen is advised. A refill of Adderall XR 25 mg has been provided and sent to [facility] in [place].    2. Menstrual cycle-related leg numbness.  The patient reports numbness in her legs occurring just before and during the first few days of her menstrual cycle. This numbness is similar to the sensation experienced during past migraines but occurs without a migraine. It is recommended to consult with her OBGYN to determine if the numbness is hormone-related and to consider any necessary imaging. If the OBGYN does not find a hormonal cause, further evaluation with x-rays of the back may be considered to rule out a pinched nerve.    Follow-up  Patient is scheduled for a follow-up visit in 3 months.               Transcribed from ambient dictation for Mi Belle MD by Mi Belle MD.  11/26/24   16:37 CST    Patient or patient representative  verbalized consent for the use of Ambient Listening during the visit with  Mi Belle MD for chart documentation. 12/13/2024  16:37 CST          This document has been electronically signed by Mi Belle MD on December 13, 2024 17:02 CST      Answers submitted by the patient for this visit:  Primary Reason for Visit (Submitted on 11/26/2024)  What is the primary reason for your visit?: Problem Not Listed  Problem not listed (Submitted on 11/26/2024)  Chief Complaint: Other medical problem  Reason for appointment: Medication refill  abdominal pain: No  anorexia: No  joint pain: No  change in stool: No  chest pain: No  chills: No  nasal congestion: No  cough: No  diaphoresis: No  fatigue: No  fever: No  headaches: No  joint swelling: No  myalgias: No  nausea: No  neck pain: No  numbness: No  rash: No  sore throat: No  swollen glands: No  dysuria: No  vertigo: No  visual change: No  vomiting: No  weakness: No

## 2025-01-02 DIAGNOSIS — F90.9 ADULT ADHD: ICD-10-CM

## 2025-01-06 RX ORDER — DEXTROAMPHETAMINE SULFATE, DEXTROAMPHETAMINE SACCHARATE, AMPHETAMINE SULFATE AND AMPHETAMINE ASPARTATE 6.25; 6.25; 6.25; 6.25 MG/1; MG/1; MG/1; MG/1
25 CAPSULE, EXTENDED RELEASE ORAL EVERY MORNING
Qty: 30 CAPSULE | Refills: 0 | Status: SHIPPED | OUTPATIENT
Start: 2025-01-06

## 2025-01-06 NOTE — TELEPHONE ENCOUNTER
Caller: Mi Caban    Relationship to patient: Self    Best call back number: [phone]    Patient is needing: PATIENT CALLING TO CHECK ON THE STATUS OF HER REFILL REQUEST FOR ADDERALL. PATIENT STATES SHE IS COMPLETELY OUT OF THIS MEDICATION AND IS URGENTLY NEEDING IT SENT TO THE REQUESTED PHARMACY.       
Rx Refill Note  Requested Prescriptions     Pending Prescriptions Disp Refills    Adderall XR 25 MG 24 hr capsule 30 capsule 0     Sig: Take 1 capsule by mouth Every Morning      Last office visit with prescribing clinician: 11/26/2024   Next office visit with prescribing clinician: 2/27/2025    Last RF: 11/26/2024    CC: 12/13/2023    UDS: 3/4/2024    Anita Pozo CMA  01/03/25, 14:20 CST    
No significant past surgical history

## 2025-02-02 DIAGNOSIS — F90.9 ADULT ADHD: ICD-10-CM

## 2025-02-05 NOTE — TELEPHONE ENCOUNTER
UDS 03/04/2024  CC 12/13/2023  Rx Refill Note  Requested Prescriptions     Pending Prescriptions Disp Refills    Adderall XR 25 MG 24 hr capsule 30 capsule 0     Sig: Take 1 capsule by mouth Every Morning      Last office visit with prescribing clinician: 11/26/2024   Last telemedicine visit with prescribing clinician: Visit date not found   Next office visit with prescribing clinician: 2/27/2025                         Would you like a call back once the refill request has been completed: [] Yes [] No    If the office needs to give you a call back, can they leave a voicemail: [] Yes [] No    Cheryl Hinds LPN  02/05/25, 14:59 CST

## 2025-02-06 RX ORDER — DEXTROAMPHETAMINE SULFATE, DEXTROAMPHETAMINE SACCHARATE, AMPHETAMINE SULFATE AND AMPHETAMINE ASPARTATE 6.25; 6.25; 6.25; 6.25 MG/1; MG/1; MG/1; MG/1
25 CAPSULE, EXTENDED RELEASE ORAL EVERY MORNING
Qty: 30 CAPSULE | Refills: 0 | Status: SHIPPED | OUTPATIENT
Start: 2025-02-06

## 2025-02-08 DIAGNOSIS — F32.A ANXIETY AND DEPRESSION: ICD-10-CM

## 2025-02-08 DIAGNOSIS — F41.9 ANXIETY AND DEPRESSION: ICD-10-CM

## 2025-02-10 NOTE — TELEPHONE ENCOUNTER
Rx Refill Note  Requested Prescriptions     Pending Prescriptions Disp Refills    FLUoxetine (PROzac) 20 MG capsule [Pharmacy Med Name: FLUoxetine HCl 20 MG Oral Capsule] 90 capsule 0     Sig: Take 1 capsule by mouth once daily      Last office visit with prescribing clinician: 11/26/2024   Next office visit with prescribing clinician: 2/27/2025       Gunjan Hanley MA  02/10/25, 10:42 CST

## 2025-02-27 ENCOUNTER — OFFICE VISIT (OUTPATIENT)
Dept: FAMILY MEDICINE CLINIC | Facility: CLINIC | Age: 30
End: 2025-02-27
Payer: COMMERCIAL

## 2025-02-27 VITALS
DIASTOLIC BLOOD PRESSURE: 68 MMHG | OXYGEN SATURATION: 99 % | WEIGHT: 158 LBS | HEART RATE: 99 BPM | TEMPERATURE: 97.8 F | SYSTOLIC BLOOD PRESSURE: 112 MMHG | RESPIRATION RATE: 16 BRPM | BODY MASS INDEX: 26.98 KG/M2 | HEIGHT: 64 IN

## 2025-02-27 DIAGNOSIS — F90.9 ADULT ADHD: ICD-10-CM

## 2025-02-27 PROCEDURE — 99213 OFFICE O/P EST LOW 20 MIN: CPT | Performed by: FAMILY MEDICINE

## 2025-02-27 RX ORDER — DEXTROAMPHETAMINE SULFATE, DEXTROAMPHETAMINE SACCHARATE, AMPHETAMINE SULFATE AND AMPHETAMINE ASPARTATE 6.25; 6.25; 6.25; 6.25 MG/1; MG/1; MG/1; MG/1
25 CAPSULE, EXTENDED RELEASE ORAL EVERY MORNING
Qty: 30 CAPSULE | Refills: 0 | Status: SHIPPED | OUTPATIENT
Start: 2025-02-27

## 2025-02-27 NOTE — PROGRESS NOTES
"Subjective   Mi Caban is a 29 y.o. female.     History of Present Illness  The patient presents for evaluation of medication management for ADHD and anxiety.    She reports a positive response to her current medication regimen, with no new concerns or queries at this time. She is on Prozac 20 mg once a day and Adderall XR 25 mg.    MEDICATIONS  Prozac, Adderall XR.    Results      The following portions of the patient's history were reviewed and updated as appropriate: allergies, current medications, past family history, past medical history, past social history, past surgical history, and problem list.        A review of systems was performed, and pertinent findings are noted in the HPI.    Objective   /68 (BP Location: Left arm, Patient Position: Sitting, Cuff Size: Adult)   Pulse 99   Temp 97.8 °F (36.6 °C) (Infrared)   Resp 16   Ht 161.3 cm (63.5\") Comment: per patient  Wt 71.7 kg (158 lb)   SpO2 99%   BMI 27.55 kg/m²          Physical Exam  Vitals and nursing note reviewed.   Constitutional:       General: She is not in acute distress.     Appearance: Normal appearance. She is not ill-appearing or toxic-appearing.   HENT:      Head: Normocephalic and atraumatic.      Right Ear: External ear normal.      Left Ear: External ear normal.      Nose: Nose normal.   Eyes:      General:         Right eye: No discharge.         Left eye: No discharge.      Conjunctiva/sclera: Conjunctivae normal.   Cardiovascular:      Rate and Rhythm: Tachycardia present.      Pulses: Normal pulses.   Pulmonary:      Effort: Pulmonary effort is normal. No respiratory distress.   Skin:     General: Skin is warm and dry.   Neurological:      Mental Status: She is alert and oriented to person, place, and time.   Psychiatric:         Mood and Affect: Mood normal.         Behavior: Behavior normal.         Thought Content: Thought content normal.         Judgment: Judgment normal.         Assessment & Plan   Problems " Addressed this Visit          Mental Health    Adult ADHD    Relevant Medications    Adderall XR 25 MG 24 hr capsule     Diagnoses         Codes Comments    Adult ADHD     ICD-10-CM: F90.9  ICD-9-CM: 314.01             Assessment & Plan  1. ADHD.  Her blood pressure and heart rate are within normal limits, and she is afebrile. She is currently on Prozac 20 mg once a day, which was refilled earlier this month and is good for 3 months. She is also on Adderall XR 25 mg, which needs a refill within the next week. She reports that her current medications are working well. A prescription for Adderall XR 25 mg will be sent to [facility] in [place].    Follow-up  The patient will follow up in 3 months, towards the end of May 2025, or sooner if any issues arise.               Transcribed from ambient dictation for Mi Belle MD by Mi Belle MD.  02/27/25   08:51 CST    Patient or patient representative verbalized consent for the use of Ambient Listening during the visit with  Mi Belle MD for chart documentation. 2/27/2025  08:51 CST          This document has been electronically signed by Mi Belle MD on February 27, 2025 08:51 CST

## 2025-04-03 DIAGNOSIS — F90.9 ADULT ADHD: ICD-10-CM

## 2025-04-03 NOTE — TELEPHONE ENCOUNTER
UDS need at next visit   CC 3/3/2025  Rx Refill Note  Requested Prescriptions     Pending Prescriptions Disp Refills    Adderall XR 25 MG 24 hr capsule 30 capsule 0     Sig: Take 1 capsule by mouth Every Morning      Last office visit with prescribing clinician: 2/27/2025     Next office visit with prescribing clinician: 6/3/2025       Gunjan Hanley MA  04/03/25, 07:31 CDT

## 2025-04-04 RX ORDER — DEXTROAMPHETAMINE SULFATE, DEXTROAMPHETAMINE SACCHARATE, AMPHETAMINE SULFATE AND AMPHETAMINE ASPARTATE 6.25; 6.25; 6.25; 6.25 MG/1; MG/1; MG/1; MG/1
25 CAPSULE, EXTENDED RELEASE ORAL EVERY MORNING
Qty: 30 CAPSULE | Refills: 0 | Status: SHIPPED | OUTPATIENT
Start: 2025-04-04

## 2025-05-04 DIAGNOSIS — F90.9 ADULT ADHD: ICD-10-CM

## 2025-05-07 RX ORDER — DEXTROAMPHETAMINE SULFATE, DEXTROAMPHETAMINE SACCHARATE, AMPHETAMINE SULFATE AND AMPHETAMINE ASPARTATE 6.25; 6.25; 6.25; 6.25 MG/1; MG/1; MG/1; MG/1
25 CAPSULE, EXTENDED RELEASE ORAL EVERY MORNING
Qty: 30 CAPSULE | Refills: 0 | Status: SHIPPED | OUTPATIENT
Start: 2025-05-07

## 2025-05-07 NOTE — TELEPHONE ENCOUNTER
UDS needed @ next visit  CC 3/3/25  Rx Refill Note  Requested Prescriptions     Pending Prescriptions Disp Refills    Adderall XR 25 MG 24 hr capsule 30 capsule 0     Sig: Take 1 capsule by mouth Every Morning      Last office visit with prescribing clinician: 2/27/2025      Gunjan Hanley MA  05/07/25, 10:38 CDT

## 2025-05-09 DIAGNOSIS — F41.9 ANXIETY AND DEPRESSION: ICD-10-CM

## 2025-05-09 DIAGNOSIS — F32.A ANXIETY AND DEPRESSION: ICD-10-CM

## 2025-05-28 ENCOUNTER — OFFICE VISIT (OUTPATIENT)
Dept: FAMILY MEDICINE CLINIC | Facility: CLINIC | Age: 30
End: 2025-05-28
Payer: COMMERCIAL

## 2025-05-28 VITALS
SYSTOLIC BLOOD PRESSURE: 118 MMHG | DIASTOLIC BLOOD PRESSURE: 80 MMHG | HEIGHT: 63 IN | HEART RATE: 81 BPM | OXYGEN SATURATION: 98 % | WEIGHT: 162 LBS | TEMPERATURE: 98.6 F | RESPIRATION RATE: 16 BRPM | BODY MASS INDEX: 28.7 KG/M2

## 2025-05-28 DIAGNOSIS — F90.9 ADULT ADHD: ICD-10-CM

## 2025-05-28 DIAGNOSIS — R05.9 COUGH, UNSPECIFIED TYPE: Primary | ICD-10-CM

## 2025-05-28 DIAGNOSIS — B36.9 FUNGAL SKIN INFECTION: ICD-10-CM

## 2025-05-28 LAB
EXPIRATION DATE: NORMAL
FLUAV AG UPPER RESP QL IA.RAPID: NOT DETECTED
FLUBV AG UPPER RESP QL IA.RAPID: NOT DETECTED
INTERNAL CONTROL: NORMAL
Lab: NORMAL
SARS-COV-2 AG UPPER RESP QL IA.RAPID: NOT DETECTED

## 2025-05-28 PROCEDURE — 87428 SARSCOV & INF VIR A&B AG IA: CPT | Performed by: FAMILY MEDICINE

## 2025-05-28 PROCEDURE — 99214 OFFICE O/P EST MOD 30 MIN: CPT | Performed by: FAMILY MEDICINE

## 2025-05-28 RX ORDER — KETOCONAZOLE 20 MG/G
1 CREAM TOPICAL DAILY
Qty: 60 G | Refills: 0 | Status: SHIPPED | OUTPATIENT
Start: 2025-05-28

## 2025-05-28 RX ORDER — DEXTROAMPHETAMINE SULFATE, DEXTROAMPHETAMINE SACCHARATE, AMPHETAMINE SULFATE AND AMPHETAMINE ASPARTATE 6.25; 6.25; 6.25; 6.25 MG/1; MG/1; MG/1; MG/1
25 CAPSULE, EXTENDED RELEASE ORAL EVERY MORNING
Qty: 30 CAPSULE | Refills: 0 | Status: SHIPPED | OUTPATIENT
Start: 2025-05-28

## 2025-05-28 NOTE — PROGRESS NOTES
"Simba Caban is a 29 y.o. female.     History of Present Illness  The patient presents for evaluation of cough and congestion, fungal skin infection, and medication management.    She began experiencing symptoms of a mild cough and congestion yesterday, which she attributes to inhaling particles from a brittle object she was sanding over the weekend. She reports no fever. Her coworker, who is caring for a chemotherapy patient, suggested she seek medical attention to rule out illness.    She has been dealing with a fungal infection on her left arm for several months. Despite attempts to manage it with over-the-counter creams and Vaseline, the condition persists. She describes intermittent itching and a burning sensation in the affected area.    She is currently on a daily regimen of Adderall XR 25 mg, which she reports as effective. Her last dose was administered this morning.    Results  Labs   - COVID-19 test: Negative   - Influenza test: Negative    The following portions of the patient's history were reviewed and updated as appropriate: allergies, current medications, past family history, past medical history, past social history, past surgical history, and problem list.        A review of systems was performed, and pertinent findings are noted in the HPI.    Objective   /80 (BP Location: Left arm, Patient Position: Sitting, Cuff Size: Large Adult)   Pulse 81   Temp 98.6 °F (37 °C) (Infrared)   Resp 16   Ht 160 cm (63\")   Wt 73.5 kg (162 lb)   SpO2 98%   BMI 28.70 kg/m²          Physical Exam  Vitals and nursing note reviewed.   Constitutional:       General: She is not in acute distress.     Appearance: She is well-developed. She is not diaphoretic.   HENT:      Head: Normocephalic and atraumatic.      Right Ear: Tympanic membrane, ear canal and external ear normal.      Left Ear: Tympanic membrane, ear canal and external ear normal.      Nose: Nose normal.      Mouth/Throat:      " Mouth: Mucous membranes are moist.      Pharynx: No oropharyngeal exudate or posterior oropharyngeal erythema.   Eyes:      General:         Right eye: No discharge.         Left eye: No discharge.      Conjunctiva/sclera: Conjunctivae normal.   Neck:      Thyroid: No thyromegaly.      Trachea: No tracheal deviation.   Cardiovascular:      Rate and Rhythm: Normal rate and regular rhythm.      Pulses: Normal pulses.      Heart sounds: Normal heart sounds.   Pulmonary:      Effort: Pulmonary effort is normal. No respiratory distress.      Breath sounds: Normal breath sounds. No stridor. No wheezing.   Chest:      Chest wall: No tenderness.   Musculoskeletal:         General: Normal range of motion.      Cervical back: Normal range of motion.   Lymphadenopathy:      Cervical: No cervical adenopathy.   Skin:     General: Skin is warm and dry.      Findings: Rash present.          Neurological:      Mental Status: She is alert and oriented to person, place, and time.      Motor: No abnormal muscle tone.      Coordination: Coordination normal.   Psychiatric:         Behavior: Behavior normal.         Thought Content: Thought content normal.         Judgment: Judgment normal.         Assessment & Plan   Problems Addressed this Visit          Mental Health    Adult ADHD    Relevant Medications    Adderall XR 25 MG 24 hr capsule    Other Relevant Orders    ToxASSURE Select 13 (MW) - Urine, Clean Catch     Other Visit Diagnoses         Cough, unspecified type    -  Primary    Relevant Orders    POCT SARS-CoV-2 Antigen CAROLANN + Flu (Completed)      Fungal skin infection        Relevant Medications    ketoconazole (NIZORAL) 2 % cream          Diagnoses         Codes Comments      Cough, unspecified type    -  Primary ICD-10-CM: R05.9  ICD-9-CM: 786.2       Fungal skin infection     ICD-10-CM: B36.9  ICD-9-CM: 111.9       Adult ADHD     ICD-10-CM: F90.9  ICD-9-CM: 314.01             Assessment & Plan  1. Cough and congestion.  -  The etiology of the symptoms is likely allergic in nature, potentially triggered by inhaling particles from sanding off the top layer of a brittle object.  - COVID-19 and influenza tests returned negative results. Blood pressure, heart rate, and oxygen levels are all within normal ranges, and there is no fever. Examination revealed no signs of bacterial or viral infection.  - Advised on conservative management as the body will gradually clear the irritation and inflammation.  - Steroids were offered to expedite symptom relief but were declined.    2. Fungal skin infection.  - The patient has a persistent fungal infection on the left arm, unresponsive to over-the-counter treatments. Symptoms include occasional itching and burning sensations.  - Examination confirmed the presence of a fungal infection.  - A topical antifungal cream has been prescribed, to be applied twice daily until the area clears, and continued for 5-7 days afterward. Head and Shoulders or Selsun Blue shampoo is recommended as a body wash during showers to help eliminate the fungal infection.  - The prescription will be sent to the pharmacy. If the condition does not improve, further options will be considered.    3. Adult ADHD.  - The condition is chronic but well-managed.  - A urine drug screen will be conducted today as part of the annual monitoring process. The control contract is documented in the chart, and all costs have been reviewed and deemed appropriate.  - A refill of Adderall XR 25 mg, to be taken once daily, has been provided.  - The patient is advised to maintain the scheduled appointment for a physical examination on 07/18/2025.               Transcribed from ambient dictation for Mi Belle MD by Mi Belle MD.  05/28/25   08:33 CDT    Patient or patient representative verbalized consent for the use of Ambient Listening during the visit with  Mi Belle MD for chart documentation. 5/28/2025  08:33  CDT          This document has been electronically signed by Mi Belle MD on May 28, 2025 08:38 CDT      Answers submitted by the patient for this visit:  Cough Questionnaire (Submitted on 5/27/2025)  Chief Complaint: Cough  Chronicity: new  Onset: today  Progression since onset: coming and going  Frequency: intermittently  Cough characteristics: dry, rattling  chest pain: No  chills: No  ear congestion: No  ear pain: No  fever: No  headaches: No  heartburn: No  hemoptysis: No  myalgias: No  nasal congestion: Yes  postnasal drip: Yes  rash: No  rhinorrhea: No  shortness of breath: No  sore throat: Yes  sweats: No  weight loss: No  wheezing: No  Aggravated by: nothing

## 2025-06-04 LAB — DRUGS UR: NORMAL

## 2025-06-30 DIAGNOSIS — F90.9 ADULT ADHD: ICD-10-CM

## 2025-07-01 RX ORDER — DEXTROAMPHETAMINE SULFATE, DEXTROAMPHETAMINE SACCHARATE, AMPHETAMINE SULFATE AND AMPHETAMINE ASPARTATE 6.25; 6.25; 6.25; 6.25 MG/1; MG/1; MG/1; MG/1
25 CAPSULE, EXTENDED RELEASE ORAL EVERY MORNING
Qty: 30 CAPSULE | Refills: 0 | Status: SHIPPED | OUTPATIENT
Start: 2025-07-01

## 2025-07-01 NOTE — TELEPHONE ENCOUNTER
Rx Refill Note  Requested Prescriptions     Pending Prescriptions Disp Refills    Adderall XR 25 MG 24 hr capsule 30 capsule 0     Sig: Take 1 capsule by mouth Every Morning      Last office visit with prescribing clinician: 5/28/2025   Next office visit with prescribing clinician: 7/31/2025     ToxASSURE Select 13 (ILA) - Urine, Clean Catch (05/28/2025 09:58)     CONTROLLED SUBSTANCE AGREEMENT - SCAN - CONSENT FOR TREATMENT WITH CONTROLLED SUBSTANCES, 2/27/2025, ADHD (03/03/2025)     Dorota Tomlin MA  07/01/25, 10:58 CDT

## 2025-07-31 ENCOUNTER — OFFICE VISIT (OUTPATIENT)
Dept: FAMILY MEDICINE CLINIC | Facility: CLINIC | Age: 30
End: 2025-07-31
Payer: COMMERCIAL

## 2025-07-31 VITALS
SYSTOLIC BLOOD PRESSURE: 113 MMHG | OXYGEN SATURATION: 97 % | DIASTOLIC BLOOD PRESSURE: 75 MMHG | BODY MASS INDEX: 28 KG/M2 | HEART RATE: 94 BPM | WEIGHT: 158 LBS | RESPIRATION RATE: 18 BRPM | HEIGHT: 63 IN | TEMPERATURE: 98.6 F

## 2025-07-31 VITALS
HEART RATE: 94 BPM | SYSTOLIC BLOOD PRESSURE: 113 MMHG | DIASTOLIC BLOOD PRESSURE: 75 MMHG | HEIGHT: 63 IN | WEIGHT: 158 LBS | TEMPERATURE: 98.6 F | BODY MASS INDEX: 28 KG/M2 | RESPIRATION RATE: 18 BRPM | OXYGEN SATURATION: 97 %

## 2025-07-31 DIAGNOSIS — F41.9 ANXIETY AND DEPRESSION: ICD-10-CM

## 2025-07-31 DIAGNOSIS — F90.9 ADULT ADHD: ICD-10-CM

## 2025-07-31 DIAGNOSIS — Z13.220 LIPID SCREENING: ICD-10-CM

## 2025-07-31 DIAGNOSIS — Z00.00 WELL ADULT EXAM: Primary | ICD-10-CM

## 2025-07-31 DIAGNOSIS — Z13.0 SCREENING FOR DEFICIENCY ANEMIA: ICD-10-CM

## 2025-07-31 DIAGNOSIS — R06.02 SHORTNESS OF BREATH: Primary | ICD-10-CM

## 2025-07-31 DIAGNOSIS — Z13.1 DIABETES MELLITUS SCREENING: ICD-10-CM

## 2025-07-31 DIAGNOSIS — E55.9 VITAMIN D DEFICIENCY: ICD-10-CM

## 2025-07-31 DIAGNOSIS — F32.A ANXIETY AND DEPRESSION: ICD-10-CM

## 2025-07-31 DIAGNOSIS — E66.3 OVERWEIGHT: ICD-10-CM

## 2025-07-31 PROCEDURE — 99213 OFFICE O/P EST LOW 20 MIN: CPT | Performed by: FAMILY MEDICINE

## 2025-07-31 RX ORDER — DEXTROAMPHETAMINE SULFATE, DEXTROAMPHETAMINE SACCHARATE, AMPHETAMINE SULFATE AND AMPHETAMINE ASPARTATE 6.25; 6.25; 6.25; 6.25 MG/1; MG/1; MG/1; MG/1
25 CAPSULE, EXTENDED RELEASE ORAL EVERY MORNING
Qty: 30 CAPSULE | Refills: 0 | Status: SHIPPED | OUTPATIENT
Start: 2025-07-31

## 2025-07-31 NOTE — PROGRESS NOTES
"Subjective   Mi Caban is a 30 y.o. female.     History of Present Illness  The patient is a 30-year-old female who presents to the office for a follow-up on ADHD.    She reports that her ADHD medication is working well and would like to continue on it. She is not experiencing any side effects from the medication or new acute concerns. Her appetite and sleep patterns are normal. Controlled contract in the chart. ZACHERY reviewed and appropriate. Urine drug screen is currently up to date.    She also takes Prozac 20 mg daily and will need a refill on this medication.  She has been experiencing sudden urges to take deep breaths, which she believes may be weather-related.  Results      The following portions of the patient's history were reviewed and updated as appropriate: allergies, current medications, past family history, past medical history, past social history, past surgical history, and problem list.        A review of systems was performed, and pertinent findings are noted in the HPI.    Objective   /75 (BP Location: Right arm, Patient Position: Sitting, Cuff Size: Large Adult)   Pulse 94   Temp 98.6 °F (37 °C) (Infrared)   Resp 18   Ht 160 cm (63\")   Wt 71.7 kg (158 lb)   SpO2 97%   BMI 27.99 kg/m²    BMI is >= 25 and <30. (Overweight) The following options were offered after discussion;: exercise counseling/recommendations and nutrition counseling/recommendations     Physical Exam  Vitals and nursing note reviewed.   Constitutional:       General: She is not in acute distress.     Appearance: She is well-developed. She is not diaphoretic.   HENT:      Head: Normocephalic and atraumatic.      Right Ear: External ear normal.      Left Ear: External ear normal.      Nose: Nose normal.   Eyes:      General:         Right eye: No discharge.         Left eye: No discharge.      Conjunctiva/sclera: Conjunctivae normal.   Neck:      Thyroid: No thyromegaly.      Trachea: No tracheal deviation. "   Cardiovascular:      Rate and Rhythm: Regular rhythm. Tachycardia present.      Heart sounds: Normal heart sounds.   Pulmonary:      Effort: Pulmonary effort is normal. No respiratory distress.      Breath sounds: Normal breath sounds. No stridor. No wheezing.   Chest:      Chest wall: No tenderness.   Abdominal:      General: There is no distension.      Palpations: Abdomen is soft.      Tenderness: There is no abdominal tenderness.   Musculoskeletal:         General: Normal range of motion.      Cervical back: Normal range of motion.   Lymphadenopathy:      Cervical: No cervical adenopathy.   Skin:     General: Skin is warm and dry.   Neurological:      Mental Status: She is alert and oriented to person, place, and time.      Motor: No abnormal muscle tone.      Coordination: Coordination normal.   Psychiatric:         Behavior: Behavior normal.         Thought Content: Thought content normal.         Judgment: Judgment normal.         Assessment & Plan   Problems Addressed this Visit          Mental Health    Adult ADHD    Relevant Medications    Adderall XR 25 MG 24 hr capsule    FLUoxetine (PROzac) 20 MG capsule     Other Visit Diagnoses         Shortness of breath    -  Primary      Anxiety and depression        Relevant Medications    Adderall XR 25 MG 24 hr capsule    FLUoxetine (PROzac) 20 MG capsule          Diagnoses         Codes Comments      Shortness of breath    -  Primary ICD-10-CM: R06.02  ICD-9-CM: 786.05       Adult ADHD     ICD-10-CM: F90.9  ICD-9-CM: 314.01       Anxiety and depression     ICD-10-CM: F41.9, F32.A  ICD-9-CM: 300.00, 311             Assessment & Plan  1. Attention deficit hyperactivity disorder.  - ADHD medication is working well with no side effects or new concerns.  - Urine drug screen is currently up to date.  - Kem reviewed and appropriate.  - Continue current medication regimen.    2. Medication management.  - Takes Prozac 20 mg daily.  - Reports no new acute  concerns.  - Eating and sleeping well.  - Refill for Prozac 20 mg daily will be provided.    3. Breathing issues.  - Lung sounds are clear, heart sounds are regular, no signs of infection or fluid accumulation.  - High heat and humidity could be causing respiratory discomfort. Advised to monitor symptoms over the next week or two as the weather cools down, which may alleviate symptoms.  - If symptoms persist, a chest x-ray can be considered, along with the use of an albuterol inhaler or an allergy pill.  Follow-up: She will follow up in 3 months or sooner if needed.               Transcribed from ambient dictation for Mi Belle MD by Mi Belle MD.  07/31/25   08:14 CDT    Patient or patient representative verbalized consent for the use of Ambient Listening during the visit with  Mi Belle MD for chart documentation. 7/31/2025  08:15 CDT          This document has been electronically signed by Mi Belle MD on July 31, 2025 08:15 CDT

## 2025-07-31 NOTE — PROGRESS NOTES
"Subjective   Mi Caban is a 30 y.o. female.     History of Present Illness  The patient is a 30-year-old female who presents for a physical exam.    She reports no new health concerns. She has not consumed any food this morning. She underwent a Pap smear a few months ago, which yielded normal results. She has also had a dental check-up within the past year.        Her ADHD symptoms are well-managed with Adderall XR 25 mg.    Results  Labs   - Vitamin D level: Normal   - CBC: Normal   - CMP: Normal   - LDL cholesterol: Mildly elevated    Diagnostic Testing   - Pap smear: 07/2024, Normal    The following portions of the patient's history were reviewed and updated as appropriate: allergies, current medications, past family history, past medical history, past social history, past surgical history, and problem list.        A review of systems was performed, and pertinent findings are noted in the HPI.    Objective   /75 (BP Location: Right arm, Patient Position: Sitting, Cuff Size: Large Adult)   Pulse 94   Temp 98.6 °F (37 °C) (Infrared)   Resp 18   Ht 160 cm (63\")   Wt 71.7 kg (158 lb)   SpO2 97%   BMI 27.99 kg/m²    BMI is >= 25 and <30. (Overweight) The following options were offered after discussion;: exercise counseling/recommendations and nutrition counseling/recommendations     Physical Exam  Vitals and nursing note reviewed.   Constitutional:       General: She is not in acute distress.     Appearance: Normal appearance. She is well-developed. She is not diaphoretic.   HENT:      Head: Normocephalic and atraumatic.      Right Ear: Tympanic membrane, ear canal and external ear normal.      Left Ear: Tympanic membrane, ear canal and external ear normal.      Nose: Nose normal.      Mouth/Throat:      Mouth: Mucous membranes are moist.      Pharynx: No oropharyngeal exudate or posterior oropharyngeal erythema.   Eyes:      General:         Right eye: No discharge.         Left eye: No discharge. "      Conjunctiva/sclera: Conjunctivae normal.   Neck:      Thyroid: No thyromegaly.      Trachea: No tracheal deviation.   Cardiovascular:      Rate and Rhythm: Regular rhythm. Tachycardia present.      Heart sounds: Normal heart sounds.   Pulmonary:      Effort: Pulmonary effort is normal. No respiratory distress.      Breath sounds: Normal breath sounds. No stridor. No wheezing.   Chest:      Chest wall: No tenderness.   Abdominal:      General: There is no distension.      Palpations: Abdomen is soft.      Tenderness: There is no abdominal tenderness.   Musculoskeletal:         General: Normal range of motion.      Cervical back: Normal range of motion.   Lymphadenopathy:      Cervical: No cervical adenopathy.   Skin:     General: Skin is warm and dry.   Neurological:      Mental Status: She is alert and oriented to person, place, and time.      Motor: No abnormal muscle tone.      Coordination: Coordination normal.   Psychiatric:         Mood and Affect: Mood normal.         Behavior: Behavior normal.         Thought Content: Thought content normal.         Judgment: Judgment normal.         Assessment & Plan   Problems Addressed this Visit    None  Visit Diagnoses         Well adult exam    -  Primary      Overweight          Diabetes mellitus screening        Relevant Orders    Comprehensive Metabolic Panel      Vitamin D deficiency        Relevant Orders    Vitamin D,25-Hydroxy      Lipid screening        Relevant Orders    Lipid Panel      Screening for deficiency anemia        Relevant Orders    CBC & Differential          Diagnoses         Codes Comments      Well adult exam    -  Primary ICD-10-CM: Z00.00  ICD-9-CM: V70.0       Overweight     ICD-10-CM: E66.3  ICD-9-CM: 278.02       Diabetes mellitus screening     ICD-10-CM: Z13.1  ICD-9-CM: V77.1       Vitamin D deficiency     ICD-10-CM: E55.9  ICD-9-CM: 268.9       Lipid screening     ICD-10-CM: Z13.220  ICD-9-CM: V77.91       Screening for deficiency  anemia     ICD-10-CM: Z13.0  ICD-9-CM: V78.1             Assessment & Plan  1. Health maintenance.  - Blood pressure is within the normal range at 113/75, heart rate is satisfactory, afebrile, and oxygen saturation levels are optimal.  - Weight has remained stable around 158, with a BMI of 28, slightly above the ideal range of 18 to 25. Vitamin D levels have improved, CBC and CMP are within normal limits, LDL cholesterol is mildly elevated, other lipid parameters are normal.  - Pap smear results from 07/2024 were normal, and another recent one was also normal. Tetanus vaccine is up-to-date, administered in 09/2022.  - Advised to maintain a healthy diet and regular exercise regimen. Annual blood tests and Pap smears recommended unless new symptoms or issues arise. Influenza vaccine should be received annually in the fall. Blood work recheck will be conducted today, and results will be communicated once available.        Follow-up: The patient will follow up in 1 year for her physical.               Transcribed from ambient dictation for Mi Belle MD by Mi Belle MD.  07/31/25   08:09 CDT    Patient or patient representative verbalized consent for the use of Ambient Listening during the visit with  Mi Belle MD for chart documentation. 7/31/2025  08:12 CDT          This document has been electronically signed by Mi Belle MD on July 31, 2025 08:12 CDT

## 2025-08-01 LAB
25(OH)D3+25(OH)D2 SERPL-MCNC: 69.4 NG/ML (ref 30–100)
ALBUMIN SERPL-MCNC: 4.5 G/DL (ref 3.5–5.2)
ALBUMIN/GLOB SERPL: 1.6 G/DL
ALP SERPL-CCNC: 78 U/L (ref 39–117)
ALT SERPL-CCNC: 10 U/L (ref 1–33)
AST SERPL-CCNC: 17 U/L (ref 1–32)
BASOPHILS # BLD AUTO: 0.05 10*3/MM3 (ref 0–0.2)
BASOPHILS NFR BLD AUTO: 0.8 % (ref 0–1.5)
BILIRUB SERPL-MCNC: 0.4 MG/DL (ref 0–1.2)
BUN SERPL-MCNC: 9 MG/DL (ref 6–20)
BUN/CREAT SERPL: 11.4 (ref 7–25)
CALCIUM SERPL-MCNC: 9.8 MG/DL (ref 8.6–10.5)
CHLORIDE SERPL-SCNC: 102 MMOL/L (ref 98–107)
CHOLEST SERPL-MCNC: 173 MG/DL (ref 0–200)
CO2 SERPL-SCNC: 26 MMOL/L (ref 22–29)
CREAT SERPL-MCNC: 0.79 MG/DL (ref 0.57–1)
EGFRCR SERPLBLD CKD-EPI 2021: 104 ML/MIN/1.73
EOSINOPHIL # BLD AUTO: 0.2 10*3/MM3 (ref 0–0.4)
EOSINOPHIL NFR BLD AUTO: 3.2 % (ref 0.3–6.2)
ERYTHROCYTE [DISTWIDTH] IN BLOOD BY AUTOMATED COUNT: 12.1 % (ref 12.3–15.4)
GLOBULIN SER CALC-MCNC: 2.8 GM/DL
GLUCOSE SERPL-MCNC: 83 MG/DL (ref 65–99)
HCT VFR BLD AUTO: 44.1 % (ref 34–46.6)
HDLC SERPL-MCNC: 54 MG/DL (ref 40–60)
HGB BLD-MCNC: 13.9 G/DL (ref 12–15.9)
IMM GRANULOCYTES # BLD AUTO: 0.01 10*3/MM3 (ref 0–0.05)
IMM GRANULOCYTES NFR BLD AUTO: 0.2 % (ref 0–0.5)
LDLC SERPL CALC-MCNC: 104 MG/DL (ref 0–100)
LYMPHOCYTES # BLD AUTO: 1.46 10*3/MM3 (ref 0.7–3.1)
LYMPHOCYTES NFR BLD AUTO: 23.5 % (ref 19.6–45.3)
MCH RBC QN AUTO: 29.8 PG (ref 26.6–33)
MCHC RBC AUTO-ENTMCNC: 31.5 G/DL (ref 31.5–35.7)
MCV RBC AUTO: 94.4 FL (ref 79–97)
MONOCYTES # BLD AUTO: 0.53 10*3/MM3 (ref 0.1–0.9)
MONOCYTES NFR BLD AUTO: 8.5 % (ref 5–12)
NEUTROPHILS # BLD AUTO: 3.96 10*3/MM3 (ref 1.7–7)
NEUTROPHILS NFR BLD AUTO: 63.8 % (ref 42.7–76)
NRBC BLD AUTO-RTO: 0 /100 WBC (ref 0–0.2)
PLATELET # BLD AUTO: 252 10*3/MM3 (ref 140–450)
POTASSIUM SERPL-SCNC: 4.5 MMOL/L (ref 3.5–5.2)
PROT SERPL-MCNC: 7.3 G/DL (ref 6–8.5)
RBC # BLD AUTO: 4.67 10*6/MM3 (ref 3.77–5.28)
SODIUM SERPL-SCNC: 140 MMOL/L (ref 136–145)
TRIGL SERPL-MCNC: 81 MG/DL (ref 0–150)
VLDLC SERPL CALC-MCNC: 15 MG/DL (ref 5–40)
WBC # BLD AUTO: 6.21 10*3/MM3 (ref 3.4–10.8)